# Patient Record
Sex: FEMALE | Race: WHITE | Employment: OTHER | URBAN - METROPOLITAN AREA
[De-identification: names, ages, dates, MRNs, and addresses within clinical notes are randomized per-mention and may not be internally consistent; named-entity substitution may affect disease eponyms.]

---

## 2017-11-17 ENCOUNTER — HOSPITAL ENCOUNTER (OUTPATIENT)
Dept: MAMMOGRAPHY | Age: 82
Discharge: HOME | End: 2017-11-17
Attending: FAMILY MEDICINE
Payer: MEDICARE

## 2017-11-17 DIAGNOSIS — Z12.39 SCREENING BREAST EXAMINATION: ICD-10-CM

## 2017-11-17 PROCEDURE — 77067 SCR MAMMO BI INCL CAD: CPT

## 2018-09-26 ENCOUNTER — APPOINTMENT (OUTPATIENT)
Dept: CT IMAGING | Age: 83
End: 2018-09-26
Attending: NURSE PRACTITIONER
Payer: MEDICARE

## 2018-09-26 ENCOUNTER — HOSPITAL ENCOUNTER (EMERGENCY)
Age: 83
Discharge: HOME | End: 2018-09-26
Attending: EMERGENCY MEDICINE
Payer: MEDICARE

## 2018-09-26 VITALS
WEIGHT: 176.37 LBS | BODY MASS INDEX: 29.38 KG/M2 | DIASTOLIC BLOOD PRESSURE: 75 MMHG | HEIGHT: 65 IN | TEMPERATURE: 97.9 F | RESPIRATION RATE: 18 BRPM | SYSTOLIC BLOOD PRESSURE: 153 MMHG | OXYGEN SATURATION: 98 % | HEART RATE: 82 BPM

## 2018-09-26 DIAGNOSIS — S01.01XA LACERATION OF SCALP, INITIAL ENCOUNTER: Primary | ICD-10-CM
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
## 2018-09-26 DIAGNOSIS — S09.90XA CLOSED HEAD INJURY, INITIAL ENCOUNTER: ICD-10-CM

## 2018-09-26 PROCEDURE — 74011250636 HC RX REV CODE- 250/636: Performed by: NURSE PRACTITIONER

## 2018-09-26 PROCEDURE — 99282 EMERGENCY DEPT VISIT SF MDM: CPT

## 2018-09-26 PROCEDURE — 72125 CT NECK SPINE W/O DYE: CPT

## 2018-09-26 PROCEDURE — 90471 IMMUNIZATION ADMIN: CPT

## 2018-09-26 PROCEDURE — 90715 TDAP VACCINE 7 YRS/> IM: CPT | Performed by: NURSE PRACTITIONER

## 2018-09-26 PROCEDURE — 70450 CT HEAD/BRAIN W/O DYE: CPT

## 2018-09-26 PROCEDURE — 77030018836 HC SOL IRR NACL ICUM -A

## 2018-09-26 PROCEDURE — 75810000293 HC SIMP/SUPERF WND  RPR

## 2018-09-26 PROCEDURE — 77030031132 HC SUT NYL COVD -A

## 2018-09-26 RX ORDER — LIDOCAINE HYDROCHLORIDE 10 MG/ML
5 INJECTION, SOLUTION EPIDURAL; INFILTRATION; INTRACAUDAL; PERINEURAL ONCE
Status: COMPLETED | OUTPATIENT
Start: 2018-09-26 | End: 2018-09-26

## 2018-09-26 RX ORDER — BACITRACIN 500 UNIT/G
1 PACKET (EA) TOPICAL
Status: DISCONTINUED | OUTPATIENT
Start: 2018-09-26 | End: 2018-09-26 | Stop reason: HOSPADM

## 2018-09-26 RX ADMIN — TETANUS TOXOID, REDUCED DIPHTHERIA TOXOID AND ACELLULAR PERTUSSIS VACCINE, ADSORBED 0.5 ML: 5; 2.5; 8; 8; 2.5 SUSPENSION INTRAMUSCULAR at 12:59

## 2018-09-26 RX ADMIN — LIDOCAINE HYDROCHLORIDE 5 ML: 10 INJECTION, SOLUTION EPIDURAL; INFILTRATION; INTRACAUDAL; PERINEURAL at 12:59

## 2018-09-26 NOTE — DISCHARGE INSTRUCTIONS
Preventing Falls: Care Instructions  Your Care Instructions    Getting around your home safely can be a challenge if you have injuries or health problems that make it easy for you to fall. Loose rugs and furniture in walkways are among the dangers for many older people who have problems walking or who have poor eyesight. People who have conditions such as arthritis, osteoporosis, or dementia also have to be careful not to fall. You can make your home safer with a few simple measures. Follow-up care is a key part of your treatment and safety. Be sure to make and go to all appointments, and call your doctor if you are having problems. It's also a good idea to know your test results and keep a list of the medicines you take. How can you care for yourself at home? Taking care of yourself  · You may get dizzy if you do not drink enough water. To prevent dehydration, drink plenty of fluids, enough so that your urine is light yellow or clear like water. Choose water and other caffeine-free clear liquids. If you have kidney, heart, or liver disease and have to limit fluids, talk with your doctor before you increase the amount of fluids you drink. · Exercise regularly to improve your strength, muscle tone, and balance. Walk if you can. Swimming may be a good choice if you cannot walk easily. · Have your vision and hearing checked each year or any time you notice a change. If you have trouble seeing and hearing, you might not be able to avoid objects and could lose your balance. · Know the side effects of the medicines you take. Ask your doctor or pharmacist whether the medicines you take can affect your balance. Sleeping pills or sedatives can affect your balance. · Limit the amount of alcohol you drink. Alcohol can impair your balance and other senses. · Ask your doctor whether calluses or corns on your feet need to be removed.  If you wear loose-fitting shoes because of calluses or corns, you can lose your balance and fall. · Talk to your doctor if you have numbness in your feet. Preventing falls at home  · Remove raised doorway thresholds, throw rugs, and clutter. Repair loose carpet or raised areas in the floor. · Move furniture and electrical cords to keep them out of walking paths. · Use nonskid floor wax, and wipe up spills right away, especially on ceramic tile floors. · If you use a walker or cane, put rubber tips on it. If you use crutches, clean the bottoms of them regularly with an abrasive pad, such as steel wool. · Keep your house well lit, especially [facility], and outside walkways. Use night-lights in areas such as hallways and bathrooms. Add extra light switches or use remote switches (such as switches that go on or off when you clap your hands) to make it easier to turn lights on if you have to get up during the night. · Install sturdy handrails on stairways. · Move items in your cabinets so that the things you use a lot are on the lower shelves (about waist level). · Keep a cordless phone and a flashlight with new batteries by your bed. If possible, put a phone in each of the main rooms of your house, or carry a cell phone in case you fall and cannot reach a phone. Or, you can wear a device around your neck or wrist. You push a button that sends a signal for help. · Wear low-heeled shoes that fit well and give your feet good support. Use footwear with nonskid soles. Check the heels and soles of your shoes for wear. Repair or replace worn heels or soles. · Do not wear socks without shoes on wood floors. · Walk on the grass when the sidewalks are slippery. If you live in an area that gets snow and ice in the winter, sprinkle salt on slippery steps and sidewalks. Preventing falls in the bath  · Install grab bars and nonskid mats inside and outside your shower or tub and near the toilet and sinks. · Use shower chairs and bath benches.   · Use a hand-held shower head that will allow you to sit while showering. · Get into a tub or shower by putting the weaker leg in first. Get out of a tub or shower with your strong side first.  · Repair loose toilet seats and consider installing a raised toilet seat to make getting on and off the toilet easier. · Keep your bathroom door unlocked while you are in the shower. Where can you learn more? Go to http://eric-julius.info/. Enter 0476 [id number] 69 [id number] in the search box to learn more about \"Preventing Falls: Care Instructions. \"  Current as of: May 12, 2017  Content Version: 11.7  © 6777-7557 [employer]. Care instructions adapted under license by "[employer] (which disclaims liability or warranty for this information). If you have questions about a medical condition or this instruction, always ask your healthcare professional. [place] [address] any warranty or liability for your use of this information. Learning About a Closed Head Injury  What is a closed head injury? A closed head injury happens when your head gets hit hard. The strong force of the blow causes your brain to shake in your skull. This movement can cause the brain to bruise, swell, or tear. Sometimes nerves or blood vessels also get damaged. This can cause bleeding in or around the brain. A concussion is a type of closed head injury. What are the symptoms? If you have a mild concussion, you may have a mild headache or feel \"not quite right. \" These symptoms are common. They usually go away over a few days to 4 weeks. But sometimes after a concussion, you feel like you can't function as well as before the injury. And you have new symptoms. This is called postconcussive syndrome. You may:  · Find it harder to solve problems, think, concentrate, or remember. · Have headaches. · Have changes in your sleep patterns, such as not being able to sleep or sleeping all the time. · Have changes in your personality.   · Not be interested in your usual activities. · Feel angry or anxious without a clear reason. · Lose your sense of taste or smell. · Be dizzy, lightheaded, or unsteady. It may be hard to stand or walk. How is a closed head injury treated? Any person who may have a concussion needs to see a doctor. Some people have to stay in the hospital to be watched. Others can go home safely. If you go home, follow your doctor's instructions. He or she will tell you if you need someone to watch you closely for the next 24 hours or longer. Rest is the best treatment. Get plenty of sleep at night. And try to rest during the day. · Avoid activities that are physically or mentally demanding. These include housework, exercise, and schoolwork. And don't play video games, send text messages, or use the computer. You may need to change your school or work schedule to be able to avoid these activities. · Ask your doctor when it's okay to drive, ride a bike, or operate machinery. · Take an over-the-counter pain medicine, such as acetaminophen (Tylenol), ibuprofen (Advil, Motrin), or naproxen (Aleve). Be safe with medicines. Read and follow all instructions on the label. · Check with your doctor before you use any other medicines for pain. · Do not drink alcohol or use illegal drugs. They can slow recovery. They can also increase your risk of getting a second head injury. Follow-up care is a key part of your treatment and safety. Be sure to make and go to all appointments, and call your doctor if you are having problems. It's also a good idea to know your test results and keep a list of the medicines you take. Where can you learn more? Go to http://eric-julius.info/. Enter E235 in the search box to learn more about \"Learning About a Closed Head Injury. \"  Current as of: October 9, 2017  Content Version: 11.7  © 6633-2025 [employer].  Care instructions adapted under license by [employer] (which disclaims liability or warranty for this information). If you have questions about a medical condition or this instruction, always ask your healthcare professional. [address] any warranty or liability for your use of this information. We hope that we have addressed all of your medical concerns. The examination and treatment you received in the Emergency Department were for an emergent problem and were not intended as complete care. It is important that you follow up with your healthcare provider(s) for ongoing care. If your symptoms worsen or do not improve as expected, and you are unable to reach your usual health care provider(s), you should return to the Emergency Department. Today's healthcare is undergoing tremendous change, and patient satisfaction surveys are one of the many tools to assess the quality of medical care. You may receive a survey from the [employer] organization regarding your experience in the Emergency Department. I hope that your experience has been completely positive, particularly the medical care that I provided. As such, please participate in the survey; anything less than excellent does not meet my expectations or intentions. [address] participate in nationally recognized quality of care measures. If your blood pressure is greater than 120/80, as reported below, we urge that you seek medical care to address the potential of high blood pressure, commonly known as hypertension. Hypertension can be hereditary or can be caused by certain medical conditions, pain, stress, or \"white coat syndrome. \"       Please make an appointment with your health care provider(s) for follow up of your Emergency Department visit. VITALS:   Patient Vitals for the past 8 hrs:   Temp Pulse Resp BP SpO2   09/26/18 1128 97.9 °F (36.6 °C) 82 18 153/75 98 %          Thank you for allowing us to provide you with medical care today.   We realize that you have many choices for your emergency care needs. Please choose us in the future for any continued health care needs. Quincy Hercules Brain, NP              No results found for this or any previous visit (from the past 24 hour(s)). Ct Head Wo Cont    Result Date: 9/26/2018  INDICATION:   fall EXAMINATION:  CT HEAD WO CONTRAST COMPARISON:  None TECHNIQUE:  Routine noncontrast axial head CT was performed. Sagittal and coronal reconstructions were generated. CT dose reduction was achieved through use of a standardized protocol tailored for this examination and automatic exposure control for dose modulation. Michael Martinez FINDINGS: Ventricles:  Midline, no hydrocephalus. Intracranial Hemorrhage:  None. Brain Parenchyma/Brainstem:  Normal for age. Basal Cisterns:  Normal. Paranasal Sinuses:  Visualized sinuses are clear. Soft Tissues:  No significant soft tissue swelling. Osseous Structures:  No acute fracture. Additional Comments:  N/A. IMPRESSION: No acute traumatic injury. Ct Spine Cerv Wo Cont    Result Date: 9/26/2018  INDICATION:   fall COMPARISON: None. TECHNIQUE:   Noncontrast axial CT imaging of the cervical spine was performed. Coronal and sagittal reconstructions were obtained. CT dose reduction was achieved through use of a standardized protocol tailored for this examination and automatic exposure control for dose modulation. FINDINGS: There is no acute fracture or subluxation. Accentuated lordosis of the cervical spine. Prior anterior cervical fusion C6-7 with intact vertebral body screws, fixation plate and interbody graft. No prevertebral soft tissue swelling. Lung apices are clear. Mild calcific atherosclerosis bilateral carotid bifurcations. IMPRESSION: Postsurgical changes as above. No acute fracture.

## 2018-12-03 ENCOUNTER — HOSPITAL ENCOUNTER (OUTPATIENT)
Dept: MAMMOGRAPHY | Age: 83
Discharge: HOME | End: 2018-12-03
Attending: FAMILY MEDICINE
Payer: MEDICARE

## 2018-12-03 DIAGNOSIS — Z12.31 VISIT FOR SCREENING MAMMOGRAM: ICD-10-CM

## 2018-12-03 PROCEDURE — 77067 SCR MAMMO BI INCL CAD: CPT

## 2020-01-02 ENCOUNTER — HOSPITAL ENCOUNTER (OUTPATIENT)
Dept: MAMMOGRAPHY | Age: 85
Discharge: HOME | End: 2020-01-02
Attending: FAMILY MEDICINE
Payer: MEDICARE

## 2020-01-02 DIAGNOSIS — Z12.31 VISIT FOR SCREENING MAMMOGRAM: ICD-10-CM

## 2020-01-02 PROCEDURE — 77067 SCR MAMMO BI INCL CAD: CPT

## 2020-12-28 ENCOUNTER — TRANSCRIBE ORDER (OUTPATIENT)
Dept: SCHEDULING | Age: 85
End: 2020-12-28

## 2020-12-28 DIAGNOSIS — Z12.31 VISIT FOR SCREENING MAMMOGRAM: Primary | ICD-10-CM

## 2021-02-10 ENCOUNTER — HOSPITAL ENCOUNTER (OUTPATIENT)
Dept: MAMMOGRAPHY | Age: 86
Discharge: HOME | End: 2021-02-10
Attending: FAMILY MEDICINE
Payer: MEDICARE

## 2021-02-10 DIAGNOSIS — Z12.31 VISIT FOR SCREENING MAMMOGRAM: ICD-10-CM

## 2021-02-10 PROCEDURE — 77067 SCR MAMMO BI INCL CAD: CPT

## 2021-05-11 ENCOUNTER — HOSPITAL ENCOUNTER (OUTPATIENT)
Dept: PREADMISSION TESTING | Age: 86
Discharge: HOME | End: 2021-05-11
Attending: ORTHOPAEDIC SURGERY
Payer: MEDICARE

## 2021-05-11 VITALS
HEART RATE: 91 BPM | SYSTOLIC BLOOD PRESSURE: 116 MMHG | OXYGEN SATURATION: 99 % | DIASTOLIC BLOOD PRESSURE: 75 MMHG | BODY MASS INDEX: 31.52 KG/M2 | RESPIRATION RATE: 16 BRPM | HEIGHT: 63 IN | WEIGHT: 177.91 LBS | TEMPERATURE: 98.2 F

## 2021-05-11 LAB
ABO + RH BLD: NORMAL
ALBUMIN SERPL-MCNC: 3.5 G/DL (ref 3.5–5)
ALBUMIN/GLOB SERPL: 1.1 {RATIO} (ref 1.1–2.2)
ALP SERPL-CCNC: 79 U/L (ref 45–117)
ALT SERPL-CCNC: 21 U/L (ref 12–78)
ANION GAP SERPL CALC-SCNC: 3 MMOL/L (ref 5–15)
APPEARANCE UR: CLEAR
AST SERPL-CCNC: 16 U/L (ref 15–37)
ATRIAL RATE: 79 BPM
BACTERIA URNS QL MICRO: NEGATIVE /HPF
BILIRUB SERPL-MCNC: 0.5 MG/DL (ref 0.2–1)
BILIRUB UR QL: NEGATIVE
BLOOD GROUP ANTIBODIES SERPL: NORMAL
BUN SERPL-MCNC: 11 MG/DL (ref 6–20)
BUN/CREAT SERPL: 15 (ref 12–20)
CALCIUM SERPL-MCNC: 8.8 MG/DL (ref 8.5–10.1)
CALCULATED P AXIS, ECG09: 17 DEGREES
CALCULATED R AXIS, ECG10: -3 DEGREES
CALCULATED T AXIS, ECG11: 89 DEGREES
CHLORIDE SERPL-SCNC: 110 MMOL/L (ref 97–108)
CO2 SERPL-SCNC: 28 MMOL/L (ref 21–32)
COLOR UR: ABNORMAL
CREAT SERPL-MCNC: 0.72 MG/DL (ref 0.55–1.02)
DIAGNOSIS, 93000: NORMAL
EPITH CASTS URNS QL MICRO: ABNORMAL /LPF
ERYTHROCYTE [DISTWIDTH] IN BLOOD BY AUTOMATED COUNT: 13.2 % (ref 11.5–14.5)
EST. AVERAGE GLUCOSE BLD GHB EST-MCNC: 108 MG/DL
GLOBULIN SER CALC-MCNC: 3.3 G/DL (ref 2–4)
GLUCOSE SERPL-MCNC: 100 MG/DL (ref 65–100)
GLUCOSE UR STRIP.AUTO-MCNC: NEGATIVE MG/DL
HBA1C MFR BLD: 5.4 % (ref 4–5.6)
HCT VFR BLD AUTO: 44.6 % (ref 35–47)
HGB BLD-MCNC: 14.5 G/DL (ref 11.5–16)
HGB UR QL STRIP: NEGATIVE
INR PPP: 1 (ref 0.9–1.1)
KETONES UR QL STRIP.AUTO: NEGATIVE MG/DL
LEUKOCYTE ESTERASE UR QL STRIP.AUTO: ABNORMAL
MCH RBC QN AUTO: 30.6 PG (ref 26–34)
MCHC RBC AUTO-ENTMCNC: 32.5 G/DL (ref 30–36.5)
MCV RBC AUTO: 94.1 FL (ref 80–99)
NITRITE UR QL STRIP.AUTO: NEGATIVE
NRBC # BLD: 0 K/UL (ref 0–0.01)
NRBC BLD-RTO: 0 PER 100 WBC
P-R INTERVAL, ECG05: 118 MS
PH UR STRIP: 5 [PH] (ref 5–8)
PLATELET # BLD AUTO: 207 K/UL (ref 150–400)
PMV BLD AUTO: 11 FL (ref 8.9–12.9)
POTASSIUM SERPL-SCNC: 3.6 MMOL/L (ref 3.5–5.1)
PROT SERPL-MCNC: 6.8 G/DL (ref 6.4–8.2)
PROT UR STRIP-MCNC: 30 MG/DL
PROTHROMBIN TIME: 10.6 SEC (ref 9–11.1)
Q-T INTERVAL, ECG07: 388 MS
QRS DURATION, ECG06: 100 MS
QTC CALCULATION (BEZET), ECG08: 444 MS
RBC # BLD AUTO: 4.74 M/UL (ref 3.8–5.2)
RBC #/AREA URNS HPF: ABNORMAL /HPF (ref 0–5)
SODIUM SERPL-SCNC: 141 MMOL/L (ref 136–145)
SP GR UR REFRACTOMETRY: 1.02 (ref 1–1.03)
SPECIMEN EXP DATE BLD: NORMAL
UA: UC IF INDICATED,UAUC: ABNORMAL
UROBILINOGEN UR QL STRIP.AUTO: 0.2 EU/DL (ref 0.2–1)
VENTRICULAR RATE, ECG03: 79 BPM
WBC # BLD AUTO: 5.5 K/UL (ref 3.6–11)
WBC URNS QL MICRO: ABNORMAL /HPF (ref 0–4)

## 2021-05-11 PROCEDURE — 80053 COMPREHEN METABOLIC PANEL: CPT

## 2021-05-11 PROCEDURE — 93005 ELECTROCARDIOGRAM TRACING: CPT

## 2021-05-11 PROCEDURE — 83036 HEMOGLOBIN GLYCOSYLATED A1C: CPT

## 2021-05-11 PROCEDURE — 36415 COLL VENOUS BLD VENIPUNCTURE: CPT

## 2021-05-11 PROCEDURE — 85610 PROTHROMBIN TIME: CPT

## 2021-05-11 PROCEDURE — 86901 BLOOD TYPING SEROLOGIC RH(D): CPT

## 2021-05-11 PROCEDURE — 81001 URINALYSIS AUTO W/SCOPE: CPT

## 2021-05-11 PROCEDURE — 85027 COMPLETE CBC AUTOMATED: CPT

## 2021-05-11 RX ORDER — PREGABALIN 75 MG/1
75 CAPSULE ORAL ONCE
Status: CANCELLED | OUTPATIENT
Start: 2021-05-20 | End: 2021-05-20

## 2021-05-11 RX ORDER — ACETAMINOPHEN 500 MG
1000 TABLET ORAL ONCE
Status: CANCELLED | OUTPATIENT
Start: 2021-05-20 | End: 2021-05-20

## 2021-05-11 RX ORDER — CELECOXIB 200 MG/1
400 CAPSULE ORAL ONCE
Status: CANCELLED | OUTPATIENT
Start: 2021-05-20 | End: 2021-05-20

## 2021-05-11 RX ORDER — SODIUM CHLORIDE, SODIUM LACTATE, POTASSIUM CHLORIDE, CALCIUM CHLORIDE 600; 310; 30; 20 MG/100ML; MG/100ML; MG/100ML; MG/100ML
25 INJECTION, SOLUTION INTRAVENOUS CONTINUOUS
Status: CANCELLED | OUTPATIENT
Start: 2021-05-20

## 2021-05-11 NOTE — PERIOP NOTES
[facility]  Joint/Spine Preoperative Instructions    Covid test scheduled on [place] May 16  between 07 and 4615. Please see attached location/directions and self-quarantine after test through day of surgery. Surgery Date May 20         Time of Arrival 0800  Contact# [phone]    1. On the day of your surgery, please report to the Surgical Services Registration Desk and sign in at your designated time. The Surgery Center is located to the right of the Emergency Room. 2. You must have someone with you to drive you home. You should not drive a car for 24 hours following surgery. Please make arrangements for a friend or family member to stay with you for the first 24 hours after your surgery. 3. No food after midnight May 19  Medications morning of surgery should be taken with a sip of water. Please follow pre-surgery drink instructions that were given at your Pre Admission Testing appointment. 4. We recommend you do not drink any alcoholic beverages for 24 hours before and after your surgery. 5. Contact your surgeons office for instructions on the following medications: non-steroidal anti-inflammatory drugs (i.e. Advil, Aleve), vitamins, and supplements. (Some surgeons will want you to stop these medications prior to surgery and others may allow you to take them)  **If you are currently taking Plavix, Coumadin, Aspirin and/or other blood-thinning agents, contact your surgeon for instructions. ** Your surgeon will partner with the physician prescribing these medications to determine if it is safe to stop or if you need to continue taking. Please do not stop taking these medications without instructions from your surgeon    6. Wear comfortable clothes. Wear glasses instead of contacts. Do not bring any money or jewelry. Please bring picture ID, insurance card, and any prearranged co-payment or hospital payment.   Do not wear make-up, particularly mascara the morning of your surgery. Do not wear nail polish, particularly if you are having foot /hand surgery. Wear your hair loose or down, no ponytails, buns, get pins or clips. All body piercings must be removed. Please shower with antibacterial soap for three consecutive days before and on the morning of surgery, but do not apply any lotions, powders or deodorants after the shower on the day of surgery. Please use a fresh towels after each shower. Please sleep in clean clothes and change bed linens the night before surgery. Please do not shave for 48 hours prior to surgery. Shaving of the face is acceptable. 7. You should understand that if you do not follow these instructions your surgery may be cancelled. If your physical condition changes (I.e. fever, cold or flu) please contact your surgeon as soon as possible. 8. It is important that you be on time. If a situation occurs where you may be late, please call [phone] (OR Holding Area). 9. If you have any questions and or problems, please call [phone] (Pre-admission Testing). 10. Your surgery time may be subject to change. You will receive a phone call the evening prior if your time changes. 11.  If having outpatient surgery, you must have someone to drive you here, stay with you during the duration of your stay, and to drive you home at time of discharge. 12. The following link is for the educational video for patients and/or families. http://de santiago-hicks.[employer]/. [email or web address]-materials    Special Instructions: Follow surgeon instructions for holding all non-steroidal anti-inflammatory drugs (NSAIDs), blood-thinning agents, vitamins & supplements prior to surgery.     Practice incentive spirometry twice a day- ten times each- bring day of usrgery    TAKE ALL MEDICATIONS THE DAY OF SURGERY EXCEPT:none      I understand a pre-operative phone call will be made to verify my surgery time. In the event that I am not available, I give permission for a message to be left on my answering service and/or with another person? yes         ___________________      __________   _________    (Signature of Patient)             (Witness)                (Date and Time)

## 2021-05-11 NOTE — PERIOP NOTES
Incentive Spirometer        Using the incentive spirometer helps expand the small air sacs of your lungs, helps you breathe deeply, and helps improve your lung function. Use your incentive spirometer twice a day (10 breaths each time) prior to surgery. How to Use Your Incentive Spirometer:  1. Hold the incentive spirometer in an upright position. 2. Breathe out as usual.   3. Place the mouthpiece in your mouth and seal your lips tightly around it. 4. Take a deep breath. Breathe in slowly and as deeply as possible. Keep the blue flow rate guide between the arrows. 5. Hold your breath as long as possible. Then exhale slowly and allow the piston to fall to the bottom of the column. 6. Rest for a few seconds and repeat steps one through five at least 10 times. PAT Tidal Volume____1000______________  x________2________  Date_______5/11/21________________    Morse Serve THE INCENTIVE SPIROMETER WITH YOU TO THE HOSPITAL ON THE DAY OF YOUR SURGERY. Opportunity given to ask and answer questions as well as to observe return demonstration.     Patient signature_____________________________    Witness____________________________

## 2021-05-11 NOTE — PERIOP NOTES
Orthopedic and Spine Patients: Instructions on When You Can   Eat or Drink Before Surgery      You have been provided 2 pre-surgery drinks received at your pre-admission testing appointment.  Night before surgery:  o You should drink one bottle of the  pre-surgery drink at bedtime. No food after midnight!  Day of Surgery:  o Complete 2nd bottle of the pre-surgery drink 1 hour prior to arrival at hospital.  For questions call Pre-Admission Testing at [phone]. They are available from 8:00am-5:00pm, Monday through Friday.

## 2021-05-11 NOTE — PERIOP NOTES
Hibiclens/Chlorhexidine    Preventing Infections Before and After  Your Surgery    IMPORTANT INSTRUCTIONS    Please read and follow these instructions carefully. If you are unable to comply with the below instructions your procedure will be cancelled. Every Night for Three (3) nights before your surgery:  1. Shower with an antibacterial soap, such as Dial, or the soap provided at your preassessment appointment. A shower is better than a bath for cleaning your skin. 2. If needed, ask someone to help you reach all areas of your body. Dont forget to clean your belly button with every shower. The night before your surgery: If you lose your Hibiclens/chlorhexidine please contact surgery center or you can purchase it at a local pharmacy  1. On the night before your surgery, shower with an antibacterial soap, such as Dial, or the soap provided at your preassessment appointment. 2. With one packet of Hibiclens/Chlorhexidine in hand, turn water off.  3. Apply Hibiclens antiseptic skin cleanser with a clean, freshly washed washcloth. ? Gently apply to your body from chin to toes (except the genital area) and especially the area(s) where your incision(s) will be. ? Leave Hibiclens/Chlorhexidine on your skin for at least 20 seconds. CAUTION: If needed, Hibiclens/chlorhexidine may be used to clean the folds of skin of the legs (such as in the area of the groin) and on your buttocks and hips. However, do not use Hibiclens/Chlorhexidine above the neck or in the genital area (your bottom) or put inside any area of your body. 4. Turn the water back on and rinse. 5. Dry gently with a clean, freshly washed towel. 6. After your shower, do not use any powder, deodorant, perfumes or lotion. 7. Use clean, freshly washed towels and washcloths every time you shower. 8. Wear clean, freshly washed pajamas to bed the night before surgery. 9. Sleep on clean, freshly washed sheets.   10. Do not allow pets to sleep in your bed with you. The Morning of your surgery:  1. Shower again thoroughly with an antibacterial soap, such as Dial or the soap provided at your preassessment appointment. If needed, ask someone for help to reach all areas of your body. Dont forget to clean your belly button! Rinse. 2. Dry gently with a clean, freshly washed towel. 3. After your shower, do not use any powder, deodorant, perfumes or lotion prior to surgery. 4. Put on clean, freshly washed clothing. Tips to help prevent infections after your surgery:  1. Protect your surgical wound from germs:  ? Hand washing is the most important thing you and your caregivers can do to prevent infections. ? Keep your bandage clean and dry! ? Do not touch your surgical wound. 2. Use clean, freshly washed towels and washcloths every time you shower; do not share bath linens with others. 3. Until your surgical wound is healed, wear clothing and sleep on bed linens each day that are clean and freshly washed. 4. Do not allow pets to sleep in your bed with you or touch your surgical wound. 5. Do not smoke  smoking delays wound healing. This may be a good time to stop smoking. 6. If you have diabetes, it is important for you to manage your blood sugar levels properly before your surgery as well as after your surgery. Poorly managed blood sugar levels slow down wound healing and prevent you from healing completely. If you lose your Hibiclens/chlorhexidine, please call the [facility], or it is available for purchase at your pharmacy.                ___________________      ___________________      ________________  (Signature of Patient)          (Witness)                   (Date and Time)

## 2021-05-12 LAB
BACTERIA SPEC CULT: NORMAL
BACTERIA SPEC CULT: NORMAL
SERVICE CMNT-IMP: NORMAL

## 2021-05-12 RX ORDER — CEFAZOLIN SODIUM 1 G/3ML
2 INJECTION, POWDER, FOR SOLUTION INTRAMUSCULAR; INTRAVENOUS ONCE
Status: CANCELLED | OUTPATIENT
Start: 2021-05-20 | End: 2021-05-20

## 2021-05-12 NOTE — ADVANCED PRACTICE NURSE
PAT Nurse Practitioner   Pre-Operative Chart Review/Assessment:-ORTHOPEDIC/NEUROSURGICAL SPINE                Patient Name:  Dany Lopez                                                         Age:   81 y.o.    :  1935     Today's Date:  2021     Date of PAT:   21      Date of Surgery:    21     Procedure(s):  Right  Total Knee Arthroplasty Revision Poly Swap     Surgeon:   Chuck Barrera     Medical Clearance:  Dr. Agueda Simon:      1)  Cardiac Clearance:  Not requested       2)  Diabetic Treatment Consult:  Not indicated--A1C 5.4      3)  Sleep Apnea evaluation:   Not indicated-RUDY 1      4) Treatment for MRSA/Staph Aureus:  Negative      5) Additional Concerns:  Osteoporosis, chronic pain, anxiety                Vital Signs:         Vitals:    21 0837   BP: 116/75   Pulse: 91   Resp: 16   Temp: 98.2 °F (36.8 °C)   SpO2: 99%   Weight: 80.7 kg (177 lb 14.6 oz)   Height: 5' 3\" (1.6 m)            ____________________________________________  PAST MEDICAL HISTORY  Past Medical History:   Diagnosis Date    Arthritis     History of colonoscopy with polypectomy     benign    History of kidney stones     passed in past    Hyperlipemia     Kidney stones     Osteoporosis       ____________________________________________  PAST SURGICAL HISTORY  Past Surgical History:   Procedure Laterality Date    HX BREAST BIOPSY Left     yrs ago (-)    HX COLONOSCOPY      HX ORTHOPAEDIC      gregorio TKR    HX ORTHOPAEDIC      rt 4th finger trigger release    HX DANNIE AND BSO      HX TONSILLECTOMY      HX UROLOGICAL      collegen implant & sling insertion      ____________________________________________  HOME MEDICATIONS    Current Outpatient Medications   Medication Sig    acetaminophen (TYLENOL) 325 mg tablet Take 325 mg by mouth every four (4) hours as needed. Indications: HEADACHE DISORDER    simvastatin (ZOCOR) 20 mg tablet Take 20 mg by mouth nightly.     MULTIVITS W-FE,OTHER MIN (CENTRUM PO) Take 1 Tab by mouth daily.  oxycodone (ROXICODONE) 5 mg immediate release tablet Take 1-3 Tabs by mouth every three (3) hours as needed for Pain.  diazepam (VALIUM) 5 mg tablet Take 1 Tab by mouth every eight (8) hours as needed for Anxiety.  ondansetron (ZOFRAN ODT) 4 mg disintegrating tablet Take 1 Tab by mouth every six (6) hours as needed for Nausea.  CALCIUM CARBONATE (CALCIUM 500 PO) Take 500 mg by mouth Daily (before breakfast).      No current facility-administered medications for this encounter.       ____________________________________________  ALLERGIES  No Known Allergies   ____________________________________________  SOCIAL HISTORY  Social History     Tobacco Use    Smoking status: Never Smoker    Smokeless tobacco: Never Used   Substance Use Topics    Alcohol use: No      ____________________________________________        Labs:     Hospital Outpatient Visit on 05/11/2021   Component Date Value Ref Range Status    Crossmatch Expiration 05/11/2021 05/23/2021,2359   Final    ABO/Rh(D) 05/11/2021 O POSITIVE   Final    Antibody screen 05/11/2021 NEG   Final    WBC 05/11/2021 5.5  3.6 - 11.0 K/uL Final    RBC 05/11/2021 4.74  3.80 - 5.20 M/uL Final    HGB 05/11/2021 14.5  11.5 - 16.0 g/dL Final    HCT 05/11/2021 44.6  35.0 - 47.0 % Final    MCV 05/11/2021 94.1  80.0 - 99.0 FL Final    MCH 05/11/2021 30.6  26.0 - 34.0 PG Final    MCHC 05/11/2021 32.5  30.0 - 36.5 g/dL Final    RDW 05/11/2021 13.2  11.5 - 14.5 % Final    PLATELET 14/89/3254 572  150 - 400 K/uL Final    MPV 05/11/2021 11.0  8.9 - 12.9 FL Final    NRBC 05/11/2021 0.0  0  WBC Final    ABSOLUTE NRBC 05/11/2021 0.00  0.00 - 0.01 K/uL Final    Sodium 05/11/2021 141  136 - 145 mmol/L Final    Potassium 05/11/2021 3.6  3.5 - 5.1 mmol/L Final    Chloride 05/11/2021 110* 97 - 108 mmol/L Final    CO2 05/11/2021 28  21 - 32 mmol/L Final    Anion gap 05/11/2021 3* 5 - 15 mmol/L Final  Glucose 05/11/2021 100  65 - 100 mg/dL Final    BUN 05/11/2021 11  6 - 20 MG/DL Final    Creatinine 05/11/2021 0.72  0.55 - 1.02 MG/DL Final    BUN/Creatinine ratio 05/11/2021 15  12 - 20   Final    GFR est AA 05/11/2021 >60  >60 ml/min/1.73m2 Final    GFR est non-AA 05/11/2021 >60  >60 ml/min/1.73m2 Final    Estimated GFR is calculated using the IDMS-traceable Modification of Diet in Renal Disease (MDRD) Study equation, reported for both  Americans (GFRAA) and non- Americans (GFRNA), and normalized to 1.73m2 body surface area. The physician must decide which value applies to the patient.  Calcium 05/11/2021 8.8  8.5 - 10.1 MG/DL Final    Bilirubin, total 05/11/2021 0.5  0.2 - 1.0 MG/DL Final    ALT (SGPT) 05/11/2021 21  12 - 78 U/L Final    AST (SGOT) 05/11/2021 16  15 - 37 U/L Final    Alk. phosphatase 05/11/2021 79  45 - 117 U/L Final    Protein, total 05/11/2021 6.8  6.4 - 8.2 g/dL Final    Albumin 05/11/2021 3.5  3.5 - 5.0 g/dL Final    Globulin 05/11/2021 3.3  2.0 - 4.0 g/dL Final    A-G Ratio 05/11/2021 1.1  1.1 - 2.2   Final    INR 05/11/2021 1.0  0.9 - 1.1   Final    A single therapeutic range for Vit K antagonists may not be optimal for all indications - see June, 2008 issue of Chest, American College of Chest Physicians Evidence-Based Clinical Practice Guidelines, 8th Edition.     Prothrombin time 05/11/2021 10.6  9.0 - 11.1 sec Final    Hemoglobin A1c 05/11/2021 5.4  4.0 - 5.6 % Final    Comment: NEW METHOD  PLEASE NOTE NEW REFERENCE RANGE  (NOTE)  HbA1C Interpretive Ranges  <5.7              Normal  5.7 - 6.4         Consider Prediabetes  >6.5              Consider Diabetes      Est. average glucose 05/11/2021 108  mg/dL Final    Color 05/11/2021 YELLOW/STRAW    Final    Color Reference Range: Straw, Yellow or Dark Yellow    Appearance 05/11/2021 CLEAR  CLEAR   Final    Specific gravity 05/11/2021 1.025  1.003 - 1.030   Final    pH (UA) 05/11/2021 5.0  5.0 - 8.0 Final    Protein 05/11/2021 30* NEG mg/dL Final    Glucose 05/11/2021 Negative  NEG mg/dL Final    Ketone 05/11/2021 Negative  NEG mg/dL Final    Bilirubin 05/11/2021 Negative  NEG   Final    Blood 05/11/2021 Negative  NEG   Final    Urobilinogen 05/11/2021 0.2  0.2 - 1.0 EU/dL Final    Nitrites 05/11/2021 Negative  NEG   Final    Leukocyte Esterase 05/11/2021 TRACE* NEG   Final    WBC 05/11/2021 5-10  0 - 4 /hpf Final    RBC 05/11/2021 0-5  0 - 5 /hpf Final    Epithelial cells 05/11/2021 FEW  FEW /lpf Final    Epithelial cell category consists of squamous cells and /or transitional urothelial cells. Renal tubular cells, if present, are separately identified as such.  Bacteria 05/11/2021 Negative  NEG /hpf Final    UA:UC IF INDICATED 05/11/2021 CULTURE NOT INDICATED BY UA RESULT  CNI   Final    Special Requests: 05/11/2021 NO SPECIAL REQUESTS    Final    Culture result: 05/11/2021 MRSA NOT PRESENT    Final    Culture result: 05/11/2021 Screening of patient nares for MRSA is for surveillance purposes and, if positive, to facilitate isolation considerations in high risk settings. It is not intended for automatic decolonization interventions per se as regimens are not sufficiently effective to warrant routine use.     Final    Ventricular Rate 05/11/2021 79  BPM Final    Atrial Rate 05/11/2021 79  BPM Final    P-R Interval 05/11/2021 118  ms Final    QRS Duration 05/11/2021 100  ms Final    Q-T Interval 05/11/2021 388  ms Final    QTC Calculation (Bezet) 05/11/2021 444  ms Final    Calculated P Axis 05/11/2021 17  degrees Final    Calculated R Axis 05/11/2021 -3  degrees Final    Calculated T Axis 05/11/2021 89  degrees Final    Diagnosis 05/11/2021    Final                    Value:Sinus rhythm with premature atrial complexes  Incomplete right bundle branch block  Left ventricular hypertrophy with repolarization abnormality     Confirmed by Henri Gabriel M.D. ([id number]) on 5/11/2021 9:03:40 AM            Skin:   Denies open wounds, cuts, sores, rashes or other areas of concern in PAT assessment.         Paresh Guerrero NP

## 2021-05-16 ENCOUNTER — HOSPITAL ENCOUNTER (OUTPATIENT)
Dept: PREADMISSION TESTING | Age: 86
Discharge: HOME | End: 2021-05-16
Payer: MEDICARE

## 2021-05-16 LAB — SARS-COV-2, COV2: NORMAL

## 2021-05-16 PROCEDURE — U0003 INFECTIOUS AGENT DETECTION BY NUCLEIC ACID (DNA OR RNA); SEVERE ACUTE RESPIRATORY SYNDROME CORONAVIRUS 2 (SARS-COV-2) (CORONAVIRUS DISEASE [COVID-19]), AMPLIFIED PROBE TECHNIQUE, MAKING USE OF HIGH THROUGHPUT TECHNOLOGIES AS DESCRIBED BY CMS-2020-01-R: HCPCS

## 2021-05-17 LAB — SARS-COV-2, COV2NT: NOT DETECTED

## 2021-05-20 ENCOUNTER — HOSPITAL ENCOUNTER (OUTPATIENT)
Age: 86
Setting detail: OBSERVATION
Discharge: HOME | End: 2021-05-21
Attending: ORTHOPAEDIC SURGERY | Admitting: ORTHOPAEDIC SURGERY
Payer: MEDICARE

## 2021-05-20 ENCOUNTER — ANESTHESIA EVENT (OUTPATIENT)
Dept: SURGERY | Age: 86
End: 2021-05-20
Payer: MEDICARE

## 2021-05-20 ENCOUNTER — ANESTHESIA (OUTPATIENT)
Dept: SURGERY | Age: 86
End: 2021-05-20
Payer: MEDICARE

## 2021-05-20 DIAGNOSIS — Z96.651 S/P REVISION OF TOTAL KNEE, RIGHT: Primary | ICD-10-CM

## 2021-05-20 PROCEDURE — 74011250636 HC RX REV CODE- 250/636: Performed by: ANESTHESIOLOGY

## 2021-05-20 PROCEDURE — 77030033138 HC SUT PGA STRATFX J&J -B: Performed by: ORTHOPAEDIC SURGERY

## 2021-05-20 PROCEDURE — 99218 HC RM OBSERVATION: CPT

## 2021-05-20 PROCEDURE — C1776 JOINT DEVICE (IMPLANTABLE): HCPCS | Performed by: ORTHOPAEDIC SURGERY

## 2021-05-20 PROCEDURE — 76060000033 HC ANESTHESIA 1 TO 1.5 HR: Performed by: ORTHOPAEDIC SURGERY

## 2021-05-20 PROCEDURE — 77030000032 HC CUF TRNQT ZIMM -B: Performed by: ORTHOPAEDIC SURGERY

## 2021-05-20 PROCEDURE — 77030008684 HC TU ET CUF COVD -B: Performed by: ANESTHESIOLOGY

## 2021-05-20 PROCEDURE — 64450 NJX AA&/STRD OTHER PN/BRANCH: CPT | Performed by: ANESTHESIOLOGY

## 2021-05-20 PROCEDURE — 74011250636 HC RX REV CODE- 250/636: Performed by: ORTHOPAEDIC SURGERY

## 2021-05-20 PROCEDURE — 74011250636 HC RX REV CODE- 250/636: Performed by: NURSE ANESTHETIST, CERTIFIED REGISTERED

## 2021-05-20 PROCEDURE — 77030031139 HC SUT VCRL2 J&J -A: Performed by: ORTHOPAEDIC SURGERY

## 2021-05-20 PROCEDURE — 2709999900 HC NON-CHARGEABLE SUPPLY

## 2021-05-20 PROCEDURE — 97116 GAIT TRAINING THERAPY: CPT

## 2021-05-20 PROCEDURE — 2709999900 HC NON-CHARGEABLE SUPPLY: Performed by: ORTHOPAEDIC SURGERY

## 2021-05-20 PROCEDURE — 74011000250 HC RX REV CODE- 250: Performed by: NURSE ANESTHETIST, CERTIFIED REGISTERED

## 2021-05-20 PROCEDURE — 77030013079 HC BLNKT BAIR HGGR 3M -A: Performed by: ANESTHESIOLOGY

## 2021-05-20 PROCEDURE — 74011250637 HC RX REV CODE- 250/637: Performed by: PHYSICIAN ASSISTANT

## 2021-05-20 PROCEDURE — 77030022704 HC SUT VLOC COVD -B: Performed by: ORTHOPAEDIC SURGERY

## 2021-05-20 PROCEDURE — 77010033678 HC OXYGEN DAILY

## 2021-05-20 PROCEDURE — 74011250636 HC RX REV CODE- 250/636: Performed by: PHYSICIAN ASSISTANT

## 2021-05-20 PROCEDURE — 97161 PT EVAL LOW COMPLEX 20 MIN: CPT

## 2021-05-20 PROCEDURE — 76010000161 HC OR TIME 1 TO 1.5 HR INTENSV-TIER 1: Performed by: ORTHOPAEDIC SURGERY

## 2021-05-20 PROCEDURE — 94760 N-INVAS EAR/PLS OXIMETRY 1: CPT

## 2021-05-20 PROCEDURE — 77030041074 HC DRSG AG OPTIFRM MDII -A: Performed by: ORTHOPAEDIC SURGERY

## 2021-05-20 PROCEDURE — 77030026438 HC STYL ET INTUB CARD -A: Performed by: ANESTHESIOLOGY

## 2021-05-20 PROCEDURE — 74011250637 HC RX REV CODE- 250/637: Performed by: ORTHOPAEDIC SURGERY

## 2021-05-20 PROCEDURE — 77030039497 HC CST PAD STERILE CHCS -A: Performed by: ORTHOPAEDIC SURGERY

## 2021-05-20 PROCEDURE — 76210000002 HC OR PH I REC 3 TO 3.5 HR: Performed by: ORTHOPAEDIC SURGERY

## 2021-05-20 PROCEDURE — 77030019908 HC STETH ESOPH SIMS -A: Performed by: ANESTHESIOLOGY

## 2021-05-20 PROCEDURE — 77030003601 HC NDL NRV BLK BBMI -A: Performed by: ANESTHESIOLOGY

## 2021-05-20 PROCEDURE — 77030034479 HC ADH SKN CLSR PRINEO J&J -B: Performed by: ORTHOPAEDIC SURGERY

## 2021-05-20 PROCEDURE — 77030040361 HC SLV COMPR DVT MDII -B: Performed by: ORTHOPAEDIC SURGERY

## 2021-05-20 PROCEDURE — 74011000250 HC RX REV CODE- 250: Performed by: PHYSICIAN ASSISTANT

## 2021-05-20 PROCEDURE — 77030020061 HC IV BLD WRMR ADMIN SET 3M -B: Performed by: ANESTHESIOLOGY

## 2021-05-20 PROCEDURE — 77030018673: Performed by: ORTHOPAEDIC SURGERY

## 2021-05-20 PROCEDURE — 77030033067 HC SUT PDO STRATFX SPIR J&J -B: Performed by: ORTHOPAEDIC SURGERY

## 2021-05-20 DEVICE — IMPLANTABLE DEVICE
Type: IMPLANTABLE DEVICE | Site: KNEE | Status: FUNCTIONAL
Brand: BIOMET® KNEE SYSTEM

## 2021-05-20 DEVICE — IMPLANTABLE DEVICE
Type: IMPLANTABLE DEVICE | Site: KNEE | Status: FUNCTIONAL
Brand: VANGUARD® KNEE SYSTEM

## 2021-05-20 RX ORDER — DIPHENHYDRAMINE HCL 12.5MG/5ML
12.5 LIQUID (ML) ORAL
Status: DISCONTINUED | OUTPATIENT
Start: 2021-05-20 | End: 2021-05-21 | Stop reason: HOSPADM

## 2021-05-20 RX ORDER — MORPHINE SULFATE 2 MG/ML
1 INJECTION, SOLUTION INTRAMUSCULAR; INTRAVENOUS
Status: DISCONTINUED | OUTPATIENT
Start: 2021-05-20 | End: 2021-05-21 | Stop reason: HOSPADM

## 2021-05-20 RX ORDER — LIDOCAINE HYDROCHLORIDE 10 MG/ML
0.1 INJECTION, SOLUTION EPIDURAL; INFILTRATION; INTRACAUDAL; PERINEURAL AS NEEDED
Status: DISCONTINUED | OUTPATIENT
Start: 2021-05-20 | End: 2021-05-20 | Stop reason: HOSPADM

## 2021-05-20 RX ORDER — NALOXONE HYDROCHLORIDE 0.4 MG/ML
0.4 INJECTION, SOLUTION INTRAMUSCULAR; INTRAVENOUS; SUBCUTANEOUS AS NEEDED
Status: DISCONTINUED | OUTPATIENT
Start: 2021-05-20 | End: 2021-05-21 | Stop reason: HOSPADM

## 2021-05-20 RX ORDER — SODIUM CHLORIDE, SODIUM LACTATE, POTASSIUM CHLORIDE, CALCIUM CHLORIDE 600; 310; 30; 20 MG/100ML; MG/100ML; MG/100ML; MG/100ML
25 INJECTION, SOLUTION INTRAVENOUS CONTINUOUS
Status: DISCONTINUED | OUTPATIENT
Start: 2021-05-20 | End: 2021-05-20 | Stop reason: HOSPADM

## 2021-05-20 RX ORDER — FENTANYL CITRATE 50 UG/ML
25 INJECTION, SOLUTION INTRAMUSCULAR; INTRAVENOUS
Status: DISCONTINUED | OUTPATIENT
Start: 2021-05-20 | End: 2021-05-20 | Stop reason: HOSPADM

## 2021-05-20 RX ORDER — CELECOXIB 200 MG/1
400 CAPSULE ORAL ONCE
Status: COMPLETED | OUTPATIENT
Start: 2021-05-20 | End: 2021-05-20

## 2021-05-20 RX ORDER — AMOXICILLIN 250 MG
1 CAPSULE ORAL 2 TIMES DAILY
Status: DISCONTINUED | OUTPATIENT
Start: 2021-05-20 | End: 2021-05-21 | Stop reason: HOSPADM

## 2021-05-20 RX ORDER — DEXAMETHASONE SODIUM PHOSPHATE 100 MG/10ML
INJECTION INTRAMUSCULAR; INTRAVENOUS AS NEEDED
Status: DISCONTINUED | OUTPATIENT
Start: 2021-05-20 | End: 2021-05-20

## 2021-05-20 RX ORDER — ACETAMINOPHEN 500 MG
1000 TABLET ORAL ONCE
Status: COMPLETED | OUTPATIENT
Start: 2021-05-20 | End: 2021-05-20

## 2021-05-20 RX ORDER — ACETAMINOPHEN 325 MG/1
650 TABLET ORAL EVERY 6 HOURS
Status: DISCONTINUED | OUTPATIENT
Start: 2021-05-20 | End: 2021-05-21 | Stop reason: HOSPADM

## 2021-05-20 RX ORDER — FAMOTIDINE 20 MG/1
20 TABLET, FILM COATED ORAL DAILY
Status: DISCONTINUED | OUTPATIENT
Start: 2021-05-21 | End: 2021-05-21 | Stop reason: HOSPADM

## 2021-05-20 RX ORDER — SODIUM CHLORIDE 0.9 % (FLUSH) 0.9 %
5-40 SYRINGE (ML) INJECTION EVERY 8 HOURS
Status: DISCONTINUED | OUTPATIENT
Start: 2021-05-20 | End: 2021-05-20 | Stop reason: HOSPADM

## 2021-05-20 RX ORDER — SODIUM CHLORIDE 9 MG/ML
75 INJECTION, SOLUTION INTRAVENOUS CONTINUOUS
Status: DISPENSED | OUTPATIENT
Start: 2021-05-20 | End: 2021-05-21

## 2021-05-20 RX ORDER — PREGABALIN 75 MG/1
75 CAPSULE ORAL ONCE
Status: COMPLETED | OUTPATIENT
Start: 2021-05-20 | End: 2021-05-20

## 2021-05-20 RX ORDER — OXYCODONE HYDROCHLORIDE 5 MG/1
5 TABLET ORAL
Status: DISCONTINUED | OUTPATIENT
Start: 2021-05-20 | End: 2021-05-20

## 2021-05-20 RX ORDER — SODIUM CHLORIDE 0.9 % (FLUSH) 0.9 %
5-40 SYRINGE (ML) INJECTION AS NEEDED
Status: DISCONTINUED | OUTPATIENT
Start: 2021-05-20 | End: 2021-05-21 | Stop reason: HOSPADM

## 2021-05-20 RX ORDER — DEXAMETHASONE SODIUM PHOSPHATE 4 MG/ML
10 INJECTION, SOLUTION INTRA-ARTICULAR; INTRALESIONAL; INTRAMUSCULAR; INTRAVENOUS; SOFT TISSUE ONCE
Status: COMPLETED | OUTPATIENT
Start: 2021-05-21 | End: 2021-05-21

## 2021-05-20 RX ORDER — OXYCODONE HYDROCHLORIDE 5 MG/1
2.5 TABLET ORAL
Status: DISCONTINUED | OUTPATIENT
Start: 2021-05-20 | End: 2021-05-20

## 2021-05-20 RX ORDER — ATORVASTATIN CALCIUM 10 MG/1
10 TABLET, FILM COATED ORAL
Status: DISCONTINUED | OUTPATIENT
Start: 2021-05-20 | End: 2021-05-21 | Stop reason: HOSPADM

## 2021-05-20 RX ORDER — TRAMADOL HYDROCHLORIDE 50 MG/1
50-100 TABLET ORAL
Status: DISCONTINUED | OUTPATIENT
Start: 2021-05-20 | End: 2021-05-20 | Stop reason: SDUPTHER

## 2021-05-20 RX ORDER — ASPIRIN 81 MG/1
81 TABLET ORAL 2 TIMES DAILY
Status: DISCONTINUED | OUTPATIENT
Start: 2021-05-20 | End: 2021-05-21 | Stop reason: HOSPADM

## 2021-05-20 RX ORDER — FACIAL-BODY WIPES
10 EACH TOPICAL DAILY PRN
Status: DISCONTINUED | OUTPATIENT
Start: 2021-05-22 | End: 2021-05-21 | Stop reason: HOSPADM

## 2021-05-20 RX ORDER — HYDROMORPHONE HYDROCHLORIDE 2 MG/ML
INJECTION, SOLUTION INTRAMUSCULAR; INTRAVENOUS; SUBCUTANEOUS AS NEEDED
Status: DISCONTINUED | OUTPATIENT
Start: 2021-05-20 | End: 2021-05-20 | Stop reason: HOSPADM

## 2021-05-20 RX ORDER — PROPOFOL 10 MG/ML
INJECTION, EMULSION INTRAVENOUS AS NEEDED
Status: DISCONTINUED | OUTPATIENT
Start: 2021-05-20 | End: 2021-05-20 | Stop reason: HOSPADM

## 2021-05-20 RX ORDER — DEXAMETHASONE SODIUM PHOSPHATE 100 MG/10ML
INJECTION INTRAMUSCULAR; INTRAVENOUS AS NEEDED
Status: DISCONTINUED | OUTPATIENT
Start: 2021-05-20 | End: 2021-05-20 | Stop reason: HOSPADM

## 2021-05-20 RX ORDER — POLYETHYLENE GLYCOL 3350 17 G/17G
17 POWDER, FOR SOLUTION ORAL DAILY
Status: DISCONTINUED | OUTPATIENT
Start: 2021-05-21 | End: 2021-05-21 | Stop reason: HOSPADM

## 2021-05-20 RX ORDER — SUCCINYLCHOLINE CHLORIDE 20 MG/ML
INJECTION INTRAMUSCULAR; INTRAVENOUS AS NEEDED
Status: DISCONTINUED | OUTPATIENT
Start: 2021-05-20 | End: 2021-05-20 | Stop reason: HOSPADM

## 2021-05-20 RX ORDER — TRAMADOL HYDROCHLORIDE 50 MG/1
100 TABLET ORAL
Status: DISCONTINUED | OUTPATIENT
Start: 2021-05-20 | End: 2021-05-21 | Stop reason: HOSPADM

## 2021-05-20 RX ORDER — SODIUM CHLORIDE 0.9 % (FLUSH) 0.9 %
5-40 SYRINGE (ML) INJECTION EVERY 8 HOURS
Status: DISCONTINUED | OUTPATIENT
Start: 2021-05-20 | End: 2021-05-21 | Stop reason: HOSPADM

## 2021-05-20 RX ORDER — SODIUM CHLORIDE 0.9 % (FLUSH) 0.9 %
5-40 SYRINGE (ML) INJECTION AS NEEDED
Status: DISCONTINUED | OUTPATIENT
Start: 2021-05-20 | End: 2021-05-20 | Stop reason: HOSPADM

## 2021-05-20 RX ORDER — DEXMEDETOMIDINE HYDROCHLORIDE 100 UG/ML
INJECTION, SOLUTION INTRAVENOUS AS NEEDED
Status: DISCONTINUED | OUTPATIENT
Start: 2021-05-20 | End: 2021-05-20 | Stop reason: HOSPADM

## 2021-05-20 RX ORDER — HYDROMORPHONE HYDROCHLORIDE 1 MG/ML
.2-.5 INJECTION, SOLUTION INTRAMUSCULAR; INTRAVENOUS; SUBCUTANEOUS
Status: DISCONTINUED | OUTPATIENT
Start: 2021-05-20 | End: 2021-05-20 | Stop reason: HOSPADM

## 2021-05-20 RX ORDER — ONDANSETRON 4 MG/1
4 TABLET, ORALLY DISINTEGRATING ORAL
Status: DISCONTINUED | OUTPATIENT
Start: 2021-05-22 | End: 2021-05-21 | Stop reason: HOSPADM

## 2021-05-20 RX ORDER — TRAMADOL HYDROCHLORIDE 50 MG/1
50 TABLET ORAL
Status: DISCONTINUED | OUTPATIENT
Start: 2021-05-20 | End: 2021-05-21 | Stop reason: HOSPADM

## 2021-05-20 RX ORDER — ONDANSETRON 2 MG/ML
4 INJECTION INTRAMUSCULAR; INTRAVENOUS AS NEEDED
Status: DISCONTINUED | OUTPATIENT
Start: 2021-05-20 | End: 2021-05-20 | Stop reason: HOSPADM

## 2021-05-20 RX ORDER — LIDOCAINE HYDROCHLORIDE 20 MG/ML
INJECTION, SOLUTION EPIDURAL; INFILTRATION; INTRACAUDAL; PERINEURAL AS NEEDED
Status: DISCONTINUED | OUTPATIENT
Start: 2021-05-20 | End: 2021-05-20 | Stop reason: HOSPADM

## 2021-05-20 RX ORDER — MORPHINE SULFATE 2 MG/ML
2 INJECTION, SOLUTION INTRAMUSCULAR; INTRAVENOUS
Status: DISCONTINUED | OUTPATIENT
Start: 2021-05-20 | End: 2021-05-20 | Stop reason: HOSPADM

## 2021-05-20 RX ORDER — ONDANSETRON 2 MG/ML
4 INJECTION INTRAMUSCULAR; INTRAVENOUS
Status: DISCONTINUED | OUTPATIENT
Start: 2021-05-20 | End: 2021-05-21 | Stop reason: HOSPADM

## 2021-05-20 RX ORDER — ROPIVACAINE HYDROCHLORIDE 5 MG/ML
INJECTION, SOLUTION EPIDURAL; INFILTRATION; PERINEURAL AS NEEDED
Status: DISCONTINUED | OUTPATIENT
Start: 2021-05-20 | End: 2021-05-20 | Stop reason: HOSPADM

## 2021-05-20 RX ORDER — CEFAZOLIN SODIUM 1 G/3ML
INJECTION, POWDER, FOR SOLUTION INTRAMUSCULAR; INTRAVENOUS AS NEEDED
Status: DISCONTINUED | OUTPATIENT
Start: 2021-05-20 | End: 2021-05-20 | Stop reason: HOSPADM

## 2021-05-20 RX ORDER — ONDANSETRON 2 MG/ML
INJECTION INTRAMUSCULAR; INTRAVENOUS AS NEEDED
Status: DISCONTINUED | OUTPATIENT
Start: 2021-05-20 | End: 2021-05-20 | Stop reason: HOSPADM

## 2021-05-20 RX ORDER — DIPHENHYDRAMINE HYDROCHLORIDE 50 MG/ML
12.5 INJECTION, SOLUTION INTRAMUSCULAR; INTRAVENOUS AS NEEDED
Status: DISCONTINUED | OUTPATIENT
Start: 2021-05-20 | End: 2021-05-20 | Stop reason: HOSPADM

## 2021-05-20 RX ORDER — SODIUM CHLORIDE, SODIUM LACTATE, POTASSIUM CHLORIDE, CALCIUM CHLORIDE 600; 310; 30; 20 MG/100ML; MG/100ML; MG/100ML; MG/100ML
INJECTION, SOLUTION INTRAVENOUS
Status: DISCONTINUED | OUTPATIENT
Start: 2021-05-20 | End: 2021-05-20 | Stop reason: HOSPADM

## 2021-05-20 RX ADMIN — Medication 3 AMPULE: at 08:01

## 2021-05-20 RX ADMIN — DEXMEDETOMIDINE HYDROCHLORIDE 4 MCG: 100 INJECTION, SOLUTION, CONCENTRATE INTRAVENOUS at 09:37

## 2021-05-20 RX ADMIN — PREGABALIN 75 MG: 75 CAPSULE ORAL at 08:01

## 2021-05-20 RX ADMIN — Medication 10 ML: at 22:02

## 2021-05-20 RX ADMIN — Medication 10 ML: at 16:17

## 2021-05-20 RX ADMIN — SODIUM CHLORIDE 75 ML/HR: 900 INJECTION, SOLUTION INTRAVENOUS at 11:00

## 2021-05-20 RX ADMIN — LIDOCAINE HYDROCHLORIDE 40 ML: 20 INJECTION, SOLUTION INTRAVENOUS at 10:06

## 2021-05-20 RX ADMIN — PROPOFOL 120 MG: 10 INJECTION, EMULSION INTRAVENOUS at 09:40

## 2021-05-20 RX ADMIN — CEFAZOLIN 2 G: 1 INJECTION, POWDER, FOR SOLUTION INTRAMUSCULAR; INTRAVENOUS at 16:16

## 2021-05-20 RX ADMIN — DOCUSATE SODIUM 50MG AND SENNOSIDES 8.6MG 1 TABLET: 8.6; 5 TABLET, FILM COATED ORAL at 16:23

## 2021-05-20 RX ADMIN — CEFAZOLIN 2 G: 1 INJECTION, POWDER, FOR SOLUTION INTRAMUSCULAR; INTRAVENOUS; PARENTERAL at 09:37

## 2021-05-20 RX ADMIN — LIDOCAINE HYDROCHLORIDE 20 ML: 20 INJECTION, SOLUTION INTRAVENOUS at 09:40

## 2021-05-20 RX ADMIN — ASPIRIN 81 MG: 81 TABLET, COATED ORAL at 16:22

## 2021-05-20 RX ADMIN — SODIUM CHLORIDE, POTASSIUM CHLORIDE, SODIUM LACTATE AND CALCIUM CHLORIDE 25 ML/HR: 600; 310; 30; 20 INJECTION, SOLUTION INTRAVENOUS at 08:01

## 2021-05-20 RX ADMIN — ATORVASTATIN CALCIUM 10 MG: 10 TABLET, FILM COATED ORAL at 22:02

## 2021-05-20 RX ADMIN — SUCCINYLCHOLINE CHLORIDE 140 MG: 20 INJECTION, SOLUTION INTRAMUSCULAR; INTRAVENOUS at 09:40

## 2021-05-20 RX ADMIN — PROPOFOL 10 MG: 10 INJECTION, EMULSION INTRAVENOUS at 08:27

## 2021-05-20 RX ADMIN — PROPOFOL 20 MG: 10 INJECTION, EMULSION INTRAVENOUS at 08:28

## 2021-05-20 RX ADMIN — CELECOXIB 400 MG: 200 CAPSULE ORAL at 08:01

## 2021-05-20 RX ADMIN — DEXAMETHASONE SODIUM PHOSPHATE 4 MG: 10 INJECTION INTRAMUSCULAR; INTRAVENOUS at 09:46

## 2021-05-20 RX ADMIN — ONDANSETRON HYDROCHLORIDE 4 MG: 2 INJECTION, SOLUTION INTRAMUSCULAR; INTRAVENOUS at 09:46

## 2021-05-20 RX ADMIN — ROPIVACAINE HYDROCHLORIDE 30 ML: 5 INJECTION, SOLUTION EPIDURAL; INFILTRATION; PERINEURAL at 08:30

## 2021-05-20 RX ADMIN — Medication 1000 MG: at 08:01

## 2021-05-20 RX ADMIN — ONDANSETRON 4 MG: 2 INJECTION INTRAMUSCULAR; INTRAVENOUS at 16:17

## 2021-05-20 RX ADMIN — ACETAMINOPHEN 650 MG: 325 TABLET ORAL at 16:15

## 2021-05-20 RX ADMIN — HYDROMORPHONE HYDROCHLORIDE 0.4 MG: 2 INJECTION, SOLUTION INTRAMUSCULAR; INTRAVENOUS; SUBCUTANEOUS at 09:37

## 2021-05-20 RX ADMIN — SODIUM CHLORIDE, POTASSIUM CHLORIDE, SODIUM LACTATE AND CALCIUM CHLORIDE: 600; 310; 30; 20 INJECTION, SOLUTION INTRAVENOUS at 08:33

## 2021-05-20 NOTE — PROGRESS NOTES
Transition of Care Plan:    RUR:OBS  Disposition:Home with [facility]  Follow up appointments: PCP, Surgeon  DME needed: Pt does not use any DME currently. Transportation at Discharge: Pt's spouse will transport at d/c.  [employer] or means to access home:  Pt's  will provide. IM Medicare letter: Angelica Erickson  Caregiver Contact: Sameera Francis [phone]  Discharge Caregiver contacted prior to discharge? CM will contact cg at d/c if pt desires. Reason for Admission:  S/P Right Total Knee Arthroplasty                      RUR Score:    n/a                 Plan for utilizing home health: If recommended      PCP: First and Last name:  Nadege Quarles MD     Name of Practice:    Are you a current patient: Yes/No: yes   Approximate date of last visit: in March   Can you participate in a virtual visit with your PCP: prefers in office                    Current Advanced Directive/Advance Care Plan: Prior     Izaiah Gibson (ACP) Conversation      Date of Conversation: 5/30/21  Conducted with: Patient with Juan Antonio 153:     Click here to complete [address] including selection of the [address] Relationship (ie \"Primary\")      Content/Action Overview:   DECLINED ACP conversation - will revisit periodically   Reviewed DNR/DNI and patient elects Full Code (Attempt Resuscitation)    Length of Voluntary ACP Conversation in minutes:  <16 minutes (Non-Billable)    Flori Altamirano                              Transition of Care Plan:    Home with [facility]? CM met with pt and pt's  at bedside to introduce role, verify demographics, insurance and PCP. CM also discussed d/c plan. Pt is a 82 yo, ,  female who is under OBS at [facility] for a dx of S/P Right Total Knee Arthroplasty. Pt sees her PCP every year in office. Pt uses the [facility] in [place].   Pt lives with her spouse in a one floor home with 4 BRAD. Pt has supportive children. Pt does not use any DME but does have a walk in shower and high toilet seats. Pt does drive. Pt can complete her ADLs/IADLs independently. Pt has a hx of HH in the past.  Pt denied a hx of SNF or IPR. Pt's spouse will transport at d/c. CM will continue to assess for d/c needs. Care Management Interventions  PCP Verified by CM: Yes (Pt sees Dr. Abdulkadir Rivera.)  Palliative Care Criteria Met (RRAT>21 & CHF Dx)?: No  Mode of Transport at Discharge:  Other (see comment) (Pt's  will transport at d/c.)  Transition of Care Consult (CM Consult): Discharge Planning  Discharge Durable Medical Equipment: No (Pt does not use any DME.)  Physical Therapy Consult: Yes  Occupational Therapy Consult: No  Speech Therapy Consult: No  Current Support Network: Lives with Spouse, Own Home (Pt lives with spouse in a one floor home with 4 BRAD.)  Confirm Follow Up Transport: Self  The Patient and/or Patient Representative was Provided with a Choice of Provider and Agrees with the Discharge Plan?: Yes  Discharge Location  Discharge Placement: Home with outpatient services    Son Watson,   Care Management, [address]

## 2021-05-20 NOTE — PERIOP NOTES
Right I Pac and adductor canal block completed w/out complications noted. VSS. Pt on 2lpm NC. Pt drowsy but able to respond to verbal and nonverbal stimuli appropriately. Pt denies of any SOB or CP at this time.

## 2021-05-20 NOTE — PERIOP NOTES
Handoff Report from Operating Room to PACU  10:52 AM  Report received from [address] and NICOLA Tong regarding Heidi Louise. Surgeon(s):  Duane Mylar, MD  And Procedure(s) (LRB):  RIGHT TOTAL KNEE ARTHROPLASTY REVISION POLY SWAP (Right)  confirmed   with allergies and dressings discussed. Anesthesia type, drugs, patient history, complications, estimated blood loss, vital signs, intake and output, and last pain medication, lines and temperature were reviewed. 2:36 PM  TRANSFER - OUT REPORT:    Verbal report given to Satish RN(name) on eHidi Louise  being transferred to Ortho (unit) for routine progression of care       Report consisted of patients Situation, Background, Assessment and   Recommendations(SBAR). Information from the following report(s) SBAR, Kardex, MAR and Accordion was reviewed with the receiving nurse. Lines:   Peripheral IV 05/20/21 Left;Posterior Hand (Active)   Site Assessment Clean, dry, & intact 05/20/21 1052   Phlebitis Assessment 0 05/20/21 1052   Infiltration Assessment 0 05/20/21 1052   Dressing Status Clean, dry, & intact 05/20/21 1052   Dressing Type Transparent;Tape 05/20/21 1052   Hub Color/Line Status Pink; Infusing 05/20/21 1052        Opportunity for questions and clarification was provided. All belongings sent including eyeglasses.     Patient transported with:   Tech         updated on transfer

## 2021-05-20 NOTE — DISCHARGE INSTRUCTIONS
Discharge Instructions: [facility]  Surgery: Revision of Total Knee Replacement  Surgeon:   Nida Guillen MD  Surgery Date:  5/20/2021     To relieve pain:   Use ice/gel packs.  Put the ice pack directly over the wound, or anywhere you are hurting or swollen.  To control pain and swelling, keep ice on regularly, especially after physical activity.  The packs should stay cold for 3-4 hours. When it is not cold anymore, rotate with the packs in the freezer.  Elevate your leg. This will also keep swelling down.  Rest for at least 20 minutes between activity or exercises.  To keep track of your pain medications, write down what you take and when you take it.  The last dose of pain medication you got in the hospital was:       Medication    Dose    Date & Time           Choose your medications based on the pain scale below:     To keep your pain under control, take Tylenol every 6 hours for 14 days - even if you feel like you dont need it.  For mild to moderate pain (4-6 on pain scale), take one pain pill every 3-4 hours as needed.  For severe pain (7-10 on pain scale), take two pain pills every 3-4 hours as needed.  To prevent nausea, take your pain medications with food. Pain Scale                 As your pain lessens:     Slowly start taking less pain medication. You may do this by waiting longer between doses or by taking smaller doses.  Stop using the pain medications as soon as you no longer need it, usually in 2-3 weeks.  Aspirin   To prevent blood clots, you will need to take Aspirin 81 mg twice a day for 30 days.                To prevent stomach upset or bleeding:   Do not take non-steroidal anti-inflammatory medications (Ibuprofen, Advil, Motrin, Naproxen, etc.)    Take Pepcid 20 mg twice a day, or a similar home medication, while you are taking a blood thinner. Waterproof Dressing:     Keep your clear, waterproof dressing in place. It will be removed by your surgeon during your follow-up appointment in 2 weeks.  If you are having drainage, you may need to change your dressing. You will be given an extra dressing to use at home.  You will have some swelling, warmth, and bruising around the knee and up and down the leg after surgery. This will may get worse in the first few days you are home and will slowly get better over the next few weeks. OPTIFOAM DRESSING INSTRUCTIONS                              PRINEO DRESSING INSTRUCTIONS     Prineo is a type of skin glue and mesh that is keeping your wound together.  Leave this covering alone. Do not peel it off.  Do not apply oils or lotions over it.  You may shower with this dressing but do not soak it. Gently pat your wound dry when you get out of the shower.  DO NOTs:   Do not rub your surgical wound   Do not put lotion or oils on your wound.  Do not take a tub bath or go swimming until your doctor says it is ok.  You may shower with this dressing over your wound.  After showering pat the dressing dry.  If you have staples a home health nurse will remove them in about 10 days.  To increase and promote healing:   Stop Smoking (or at least cut back on       Smoking).  Eat a well-balanced diet (high in protein       and vitamin C).  If you have a poor appetite, drink Ensure, Glucerna, or         [place] Instant Breakfast for the next 30 days.  If you are diabetic, you should control your blood         sugars to prevent infection and help your wound         to heal.                     f you have a poor appetite, drink Ensure, Glucerna, or [place] Instant Breakfast for the next 30 days.      If you are diabetic, you should control your blood sugars to prevent infection and help your wound                                                to heal.     Prevent Infection    1. Wash your hands.  This is the most important thing you or your caregiver can do.  Wash your hands with soap and water (or use the hand  we gave you) before you touch any wounds. 2. Shower.  Use the antibacterial soap we gave you when you take a shower.  Shower with this soap until your wounds are healed. 3.  Use clean sheets.  Put freshly cleaned sheets on your bed after surgery.  To keep the surgery site clean, do not allow pets to sleep with you while your wound is still healing.  To prevent constipation, stay active and drink plenty of fluid.  While using pain medications, you should also take stool softeners and laxatives, such as Pericolace       and Miralax.  If you are having too many bowel       Movements, then you may need       to stop taking the laxatives.  You should have a bowel movement 3-4 days        after surgery and then at least every other day while       on pain medication.  To improve your recovery, you must be active!  Use your walker and take short walks (in your home)         about every 2 hours during the day.  Try to increase how far you walk each day.  You can put as much weight on your leg as you can tolerate while walking. ***       To avoid getting a stiff knee, work on getting your knee bent and straight as soon as possible.  Home health physical therapy will come to your       home the day after you leave the hospital.  The       therapist will visit about 4 times over the next        couple of weeks to teach you how to get out of        bed, to safely walk in your home, and to do your        exercises.  NO DRIVING until your surgeon tells you it is ok.  You can return to work when cleared by a physician.        Please call your physician immediately if you have:     Constant bleeding from your wound.  Increasing redness or swelling around your wound (Some warmth, bruising and swelling is normal).  Change in wound drainage (increase in amount, color, or bad smell).  Change in mental status (unusual behavior   Temperature over 101.5 degrees Fahrenheit      Pain or redness in the calf (back of your lower leg - see picture)     Increased swelling of the thigh, ankle, calf, or foot.  Emergency: CALL 911 if you have:     Shortness of breath     Chest pain when you cough or taking a deep breath     Please call your surgeons office at [phone] for a follow up appointment. _   You should call as soon as you get home or the next day after discharge. Ask to make an appointment in 2 weeks.  If you have questions or concerns during normal business hours, you may reach {[facility] BLANK LIST:[id number]::\"Dr. Shakeel Mata' Team at [phone]\",\"Dr. Blount [address] Team at [phone]\",\"LYNN Molina's Team at [phone]\",\"SA. Cintia Wyatt' Team at [phone]\",\"BOY Montgomery's Team at [phone]\",\"XC. [facility] Sick' Team at [phone]\",\"Dr. Jessica Johnson' Team at [phone]\"}.

## 2021-05-20 NOTE — H&P
Date of Surgery Update:  Randy Quijano was seen and examined on the day of surgery prior to the procedure by the surgical team.    There were no significant clinical changes since the completion of the History and Physical.    Exam today prior to surgery showed no acute cardiac findings, no respiratory difficulty, and no abdominal complaints or pain.        Signed By: Mason Hartmann PA-C    May 20, 2021 7:37 AM

## 2021-05-20 NOTE — PROGRESS NOTES
Problem: Mobility Impaired (Adult and Pediatric)  Goal: *Acute Goals and Plan of Care (Insert Text)  Description: FUNCTIONAL STATUS PRIOR TO ADMISSION: Patient was independent and active without use of DME.    HOME SUPPORT PRIOR TO ADMISSION: The patient lived with her  but did not require assist.    Physical Therapy Goals  Initiated 5/20/2021    1. Patient will move from supine to sit and sit to supine , scoot up and down, and roll side to side in bed with modified independence within 4 days. 2. Patient will perform sit to stand with modified independence within 4 days. 3. Patient will ambulate with modified independence for 150 feet with the least restrictive device within 4 days. 4. Patient will ascend/descend 5 stairs with B handrail(s) with modified independence within 4 days. 5. Patient will perform home exercise program per protocol with independence within 4 days. 6. Patient will demonstrate AROM 0-90 degrees in operative joint within 4 days. Outcome: Progressing Towards Goal   PHYSICAL THERAPY EVALUATION  Patient: Lakeisha Hogan (67 y.o. female)  Date: 5/20/2021  Primary Diagnosis: S/P total knee arthroplasty, right [Z96.651]  Procedure(s) (LRB):  RIGHT TOTAL KNEE ARTHROPLASTY REVISION POLY SWAP (Right) Day of Surgery   Precautions: WBAT       ASSESSMENT  Based on the objective data described below, the patient presents with expected post op R knee pain, impaired gait mechanics, decreased R knee ROM/strength, and decreased activity tolerance. Pt supine in bed, agreeable to PT evaluation. Pt on RA with stable O2 sats. Pt able to demonstrate mobility at sba to min a x 1(toilet transfer). Pt able to ambulate to/from bathroom with RW, cga, and verbal cues for gait sequencing and body position in RW. Pt returned supine in bed. Pt became nauseous and given an emesis bag. She was dry heaving. Pt given some ginger ale to drink.   Pt should progress well and be able to discharge home with HHPT after another 1-2 PT sessions. Current Level of Function Impacting Discharge (mobility/balance): sba bed mobility, cga transfers and amb with RW, min a x 1 toilet transfer    Functional Outcome Measure: The patient scored 60/100 on the Barthel Index outcome measure which is indicative of 40% impaired function/adls      Other factors to consider for discharge: supportive  at home     Patient will benefit from skilled therapy intervention to address the above noted impairments. PLAN :  Recommendations and Planned Interventions: bed mobility training, transfer training, gait training, therapeutic exercises, patient and family training/education, and therapeutic activities      Frequency/Duration: Patient will be followed by physical therapy:  twice daily to address goals. Recommendation for discharge: (in order for the patient to meet his/her long term goals)  Physical therapy at least 2 days/week in the home     This discharge recommendation:  A follow-up discussion with the attending provider and/or case management is planned    IF patient discharges home will need the following DME: none         SUBJECTIVE:   Patient stated I am not going home today.  The doctor said I could    OBJECTIVE DATA SUMMARY:   HISTORY:    Past Medical History:   Diagnosis Date    Arthritis     History of colonoscopy with polypectomy     benign    History of kidney stones     passed in past    Hyperlipemia     Kidney stones     Osteoporosis      Past Surgical History:   Procedure Laterality Date    HX BREAST BIOPSY Left     yrs ago (-)    HX COLONOSCOPY      HX ORTHOPAEDIC      gregorio TKR    HX ORTHOPAEDIC      rt 4th finger trigger release    HX DANNIE AND BSO      HX TONSILLECTOMY      HX UROLOGICAL      collegen implant & sling insertion       Personal factors and/or comorbidities impacting plan of care: none    Home Situation  # Steps to Enter: 5  Rails to Enter: Yes  Hand Rails : Bilateral  One/Two Story Residence: One story  Living Alone: No  Patient Expects to be Discharged to[de-identified] Private residence  Current DME Used/Available at Home: Cane, quad, Commode, bedside, Raised toilet seat, Shower chair, Walker, rolling  Tub or Shower Type: Shower    EXAMINATION/PRESENTATION/DECISION MAKING:   Critical Behavior:      Range Of Motion:  AROM: Within functional limits (R knee decreased but functional)           PROM: Within functional limits (RLE decreased but functional)           Strength:    Strength: Generally decreased, functional                    Tone & Sensation:   Tone: Normal              Sensation: Intact               Coordination:  Coordination: Within functional limits     Functional Mobility:  Bed Mobility:  Rolling: Stand-by assistance  Supine to Sit: Stand-by assistance  Sit to Supine: Stand-by assistance  Scooting: Stand-by assistance  Transfers:  Sit to Stand: Contact guard assistance  Stand to Sit: Stand-by assistance        Bed to Chair: Contact guard assistance     Balance:   Sitting: Intact  Standing: Impaired  Standing - Static: Constant support;Good  Standing - Dynamic : Constant support;Good;Fair  Ambulation/Gait Training:  Distance (ft): 20 Feet (ft) (10ft x 2)  Assistive Device: Gait belt;Walker, rolling  Ambulation - Level of Assistance: Contact guard assistance     Gait Description (WDL): Exceptions to WDL  Gait Abnormalities: Decreased step clearance; Step to gait        Base of Support: Shift to left; Widened  Stance: Right decreased  Speed/Flavia: Slow  Step Length: Left shortened;Right shortened  Swing Pattern: Right asymmetrical     Therapeutic Exercises:   Reviewed TKR exercises via handout    Functional Measure:  Barthel Index:    Bathin  Bladder: 10  Bowels: 10  Groomin  Dressin  Feeding: 10  Mobility: 0  Stairs: 5  Toilet Use: 5  Transfer (Bed to Chair and Back): 10  Total: 60/100       The Barthel ADL Index: Guidelines  1.  The index should be used as a record of what a patient does, not as a record of what a patient could do. 2. The main aim is to establish degree of independence from any help, physical or verbal, however minor and for whatever reason. 3. The need for supervision renders the patient not independent. 4. A patient's performance should be established using the best available evidence. Asking the patient, friends/relatives and nurses are the usual sources, but direct observation and common sense are also important. However direct testing is not needed. 5. Usually the patient's performance over the preceding 24-48 hours is important, but occasionally longer periods will be relevant. 6. Middle categories imply that the patient supplies over 50 per cent of the effort. 7. Use of aids to be independent is allowed. Cristóbal Hernandez., Barthel, D.W. ([id number]). Functional evaluation: the Barthel Index. [facility] (14)2. PHILIP Fernandez, Kristina Miles., [facility], [address] [facility] (1999). Measuring the change indisability after inpatient rehabilitation; comparison of the responsiveness of the Barthel Index and Functional [place] Measure. Journal of Neurology, Neurosurgery, and Psychiatry, 66(4), 099-597. Jasmin Macedo, N.J.A, JAMES Bolivar.MY.HAN, & Regino Ricardo, M.A. (2004.) Assessment of post-stroke quality of life in cost-effectiveness studies: The usefulness of the Barthel Index and the EuroQoL-5D.  Quality of Life Research, 15, 479-20           Physical Therapy Evaluation Charge Determination   History Examination Presentation Decision-Making   LOW Complexity : Zero comorbidities / personal factors that will impact the outcome / POC LOW Complexity : 1-2 Standardized tests and measures addressing body structure, function, activity limitation and / or participation in recreation  LOW Complexity : Stable, uncomplicated  LOW Complexity : FOTO score of       Based on the above components, the patient evaluation is determined to be of the following complexity level: LOW     Pain Rating:  3-4/10 R knee    Activity Tolerance:   Fair and pt with nausea and some dry heaving    After treatment patient left in no apparent distress:   Supine in bed, Call bell within reach, Caregiver / family present, and Side rails x 3    COMMUNICATION/EDUCATION:   The patients plan of care was discussed with: Registered nurse. Fall prevention education was provided and the patient/caregiver indicated understanding., Patient/family have participated as able in goal setting and plan of care. , and Patient/family agree to work toward stated goals and plan of care.     Thank you for this referral.  Raúl Razo, PT   Time Calculation: 22 mins

## 2021-05-20 NOTE — ANESTHESIA POSTPROCEDURE EVALUATION
Procedure(s):  RIGHT TOTAL KNEE ARTHROPLASTY REVISION POLY SWAP. general    Anesthesia Post Evaluation        Patient location during evaluation: PACU  Note status: Adequate. Level of consciousness: responsive to verbal stimuli and sleepy but conscious  Pain management: satisfactory to patient  Airway patency: patent  Anesthetic complications: no  Cardiovascular status: acceptable  Respiratory status: acceptable  Hydration status: acceptable  Comments: +Post-Anesthesia Evaluation and Assessment    Patient: Andrés Hill MRN: [id number]  SSN: [id number]   YOB: 1935  Age: 81 y.o. Sex: female      Cardiovascular Function/Vital Signs    /66   Pulse 68   Temp 36.4 °C (97.5 °F)   Resp 14   Ht 5' 3\" (1.6 m)   Wt 79.2 kg (174 lb 9.7 oz)   SpO2 96%   BMI 30.93 kg/m²     Patient is status post Procedure(s):  RIGHT TOTAL KNEE ARTHROPLASTY REVISION POLY SWAP. Nausea/Vomiting: Controlled. Postoperative hydration reviewed and adequate. Pain:  Pain Scale 1: Visual (05/20/21 1319)  Pain Intensity 1: 0 (05/20/21 1319)   Managed. Neurological Status:   Neuro (WDL): Exceptions to WDL (05/20/21 1052)   At baseline. Mental Status and Level of Consciousness: Arousable. Pulmonary Status:   O2 Device: None (Room air) (05/20/21 1319)   Adequate oxygenation and airway patent. Complications related to anesthesia: None    Post-anesthesia assessment completed. No concerns. Signed By: gNa Zuniga MD    5/20/2021  Post anesthesia nausea and vomiting:  controlled      INITIAL Post-op Vital signs:   Vitals Value Taken Time   /66 05/20/21 1330   Temp 35.7 °C (96.2 °F) 05/20/21 1343   Pulse 76 05/20/21 1358   Resp 15 05/20/21 1358   SpO2 96 % 05/20/21 1358   Vitals shown include unvalidated device data.

## 2021-05-20 NOTE — PROGRESS NOTES
End of Shift Note    Bedside shift change report given to Lurdes (oncoming nurse) by Renetta Anglin RN (offgoing nurse). Report included the following information SBAR    Shift worked:  3-7p     Shift summary and any significant changes:          Concerns for physician to address:       Zone phone for oncoming shift:          Activity:  Activity Level: Bed Rest  Number times ambulated in hallways past shift: 0  Number of times OOB to chair past shift: 0    Cardiac:   Cardiac Monitoring: No      Cardiac Rhythm: Sinus Rhythm    Access:   Current line(s): PIV     Genitourinary:   Urinary status: voiding    Respiratory:   O2 Device: None (Room air)  Chronic home O2 use?: NO  Incentive spirometer at bedside: NO     GI:  Last Bowel Movement Date: 05/20/21  Current diet:  DIET CLEAR LIQUID  Passing flatus: YES  Tolerating current diet: YES       Pain Management:   Patient states pain is manageable on current regimen: YES    Skin:  Peter Score: 23  Interventions: increase time out of bed    Patient Safety:  Fall Score:  Total Score: 1  Interventions: gripper socks       Length of Stay:  Expected LOS: - - -  Actual LOS: 0      Neris Chavez RN

## 2021-05-20 NOTE — H&P
Amy Lombardo MD - Adult Reconstruction and Total Joint Replacement     Orthopaedic History and Physical        NAME: Lakeisha Hogan       :  1935       MRN:  [id number]      Subjective:   Patient ID: Lakeisha Hogan is a 81 y.o. female. Chief Complaint: Pain of the Right Knee    Ms. Rohan Pena presents for an evaluation of her R knee pain. She received a R TKA in  by Dr. Layne Swartz. This knee gave her no trouble until this past winter when she stopped exercising. She denies any acute injury or aggravating incident. Her knee occasionally feels unstable. She is worried that she may have a fall soon. She feels like she has lost strength and stamina in her knee. Patient states that she also received a L TKA by Dr. Layne Swartz. She denies any trouble with this knee. Patient is a avid swimmer. She is fully vaccinated. 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 Patient Active Problem List    Diagnosis Date Noted    Nausea, vomiting and diarrhea 2011    Dehydration 2011    Gastroenteritis, acute 2011    Leukopenia 2011    Thrombocytopenia, unspecified ([facility] [address] 2011    Hypotension, unspecified 2011     Past Medical History:   Diagnosis Date    Arthritis     History of colonoscopy with polypectomy     benign    History of kidney stones     passed in past    Hyperlipemia     Kidney stones     Osteoporosis       Past Surgical History:   Procedure Laterality Date    HX BREAST BIOPSY Left     yrs ago (-)    HX COLONOSCOPY      HX ORTHOPAEDIC      gregorio TKR    HX ORTHOPAEDIC      rt 4th finger trigger release    HX DANNIE AND BSO      HX TONSILLECTOMY      HX UROLOGICAL      collegen implant & sling insertion      Prior to Admission medications    Medication Sig Start Date End Date Taking? Authorizing Provider   diazepam (VALIUM) 5 mg tablet Take 1 Tab by mouth every eight (8) hours as needed for Anxiety.  11  Yes Kira De Paz MD acetaminophen (TYLENOL) 325 mg tablet Take 325 mg by mouth every four (4) hours as needed. Indications: HEADACHE DISORDER   Yes Provider, Historical   ondansetron (ZOFRAN ODT) 4 mg disintegrating tablet Take 1 Tab by mouth every six (6) hours as needed for Nausea. 6/15/11  Yes Laurita Scheuermann, MD   simvastatin (ZOCOR) 20 mg tablet Take 20 mg by mouth nightly. Yes Other, MD Stacia   MULTIVITS W-FE,OTHER MIN (CENTRUM PO) Take 1 Tab by mouth daily. Yes Other, MD Stacia   CALCIUM CARBONATE (CALCIUM 500 PO) Take 500 mg by mouth Daily (before breakfast). Yes Other, MD Stacia   oxycodone (ROXICODONE) 5 mg immediate release tablet Take 1-3 Tabs by mouth every three (3) hours as needed for Pain. Patient not taking: Reported on 5/20/2021 8/11/11   Myles Staples MD     No Known Allergies   Social History     Tobacco Use    Smoking status: Never Smoker    Smokeless tobacco: Never Used   Substance Use Topics    Alcohol use: No      Family History   Problem Relation Age of Onset    Other Other         No cancer        REVIEW OF SYSTEMS: A comprehensive review of systems was negative except for that written in the HPI. Objective:   Constitutional: She appears stated age. Pt is cooperative and is in no acute distress. Well nourished. Well developed. Body habitus is obese. Body mass index is 31 kg/m². Gait / Assistive devices: none. Eyes: Sclera are nonicteric. Respiratory: No labored breathing. Cardiovascular: No marked edema. Well perfused extremities bilaterally. Skin: No marked skin ulcers. No lymphedema or skin abnormalities. Neurological: No marked sensory loss noted. Grossly neurovascularly intact. Both lower extremities are intact to distal sensory and motor function. Psychiatric: Alert and oriented x3. MUSCULOSKELETAL: R knee: Benign incision. Instability with flexion. Full range of motion. No effusion. Imaging:   X-ray knee right 4+ views ([id number])    Result Date: 4/29/2021  Standing. Views: AP, Lat, Langerma, Enzo Broussard. Impression: 4 views of R knee show biomet maxim CR total knee replacement with evidence of significant erosion of polyethylene posteriorly, mild subluxation. No signs of loosening. L depuy total knee replacement in good clinical alignment without complicating features. Assessment:     ICD-10-CM   1. History of total right knee replacement Z96.651     Plan: At this time, we reviewed today's films together, which showed wear of plastic liner of her R TKA. It is a biomet maxim CR total knee. I recommended quadriceps strengthening to increase stability of her leg. I also recommended that she consider replacing her plastic liner. We discussed this procedure at length today. I encouraged her to think about moving forward with this procedure sooner rather than later. She understands that she would need PCP clearance beforehand. She would like to proceed with scheduling this in late May. All questions were answered to her satisfaction. Follow up as needed. Scribed for Dr. Unique Reyes by [facility].          MACHO Calzada

## 2021-05-20 NOTE — PROGRESS NOTES
Bedside and Verbal shift change report given to [address]  (oncoming nurse) by Nino Chaves (offgoing nurse). Report included the following information SBAR, Kardex, Intake/Output, MAR and Recent Results.

## 2021-05-20 NOTE — ANESTHESIA PROCEDURE NOTES
Peripheral Block    Start time: 5/20/2021 8:26 AM  End time: 5/20/2021 8:33 AM  Performed by: Jasper Torres MD  Authorized by: Jasper Torres MD       Pre-procedure: Indications: at surgeon's request and post-op pain management    Preanesthetic Checklist: patient identified, risks and benefits discussed, site marked, timeout performed, anesthesia consent given and patient being monitored    Timeout Time: 08:26 EDT          Block Type:   Block Type:   Adductor canal and Ipack  Laterality:  Right  Monitoring:  Standard ASA monitoring, continuous pulse ox, frequent vital sign checks, heart rate, responsive to questions and oxygen  Injection Technique:  Single shot  Procedures: ultrasound guided    Patient Position: supine  Prep: chlorhexidine    Location:  Mid thigh  Needle Type:  Stimuplex  Needle Gauge:  21 G  Needle Localization:  Anatomical landmarks and ultrasound guidance    Assessment:  Number of attempts:  1  Injection Assessment:  Incremental injection every 5 mL, local visualized surrounding nerve on ultrasound, negative aspiration for blood, no paresthesia and no intravascular symptoms  Patient tolerance:  Patient tolerated the procedure well with no immediate complications

## 2021-05-20 NOTE — ANESTHESIA PREPROCEDURE EVALUATION
Relevant Problems   No relevant active problems       Anesthetic History     PONV          Review of Systems / Medical History  Patient summary reviewed, nursing notes reviewed and pertinent labs reviewed    Pulmonary  Within defined limits                 Neuro/Psych   Within defined limits           Cardiovascular  Within defined limits                Exercise tolerance: >4 METS     GI/Hepatic/Renal         Renal disease: stones       Endo/Other        Obesity and arthritis     Other Findings              Physical Exam    Airway  Mallampati: II  TM Distance: 4 - 6 cm  Neck ROM: normal range of motion   Mouth opening: Normal     Cardiovascular  Regular rate and rhythm,  S1 and S2 normal,  no murmur, click, rub, or gallop             Dental  No notable dental hx       Pulmonary  Breath sounds clear to auscultation               Abdominal  GI exam deferred       Other Findings            Anesthetic Plan    ASA: 2  Anesthesia type: general      Post-op pain plan if not by surgeon: peripheral nerve block single    Induction: Intravenous  Anesthetic plan and risks discussed with: Patient

## 2021-05-20 NOTE — PROGRESS NOTES
Ortho / Neurosurgery NP Note    POD# 0  s/p RIGHT TOTAL KNEE ARTHROPLASTY REVISION POLY SWAP   Pt seen with no visitor present. Pt resting in bed. Reports expected post-op pain, does not want to take oxycodone. States she only used tylenol with both TKAs in past  Discussed alternative of tramadol if she were to need it, pt in agreement   Complaints of HA, states this happens which she doesn't eat  Nausea with intermittent dry-heaves - RN to medicate with zofran    Encouraged to try crackers/bland food after Zofran       VSS Afebrile. Room air. Visit Vitals  BP (!) 162/86 (BP 1 Location: Left arm, BP Patient Position: At rest)   Pulse 76   Temp 98.9 °F (37.2 °C)   Resp 14   Ht 5' 3\" (1.6 m)   Wt 79.2 kg (174 lb 9.7 oz)   SpO2 97%   BMI 30.93 kg/m²       Voiding status: voiding  Output (mL)  Urine Voided: 350 ml (05/20/21 1319)      Labs    Lab Results   Component Value Date/Time    HGB 14.5 05/11/2021 08:10 AM      Lab Results   Component Value Date/Time    INR 1.0 05/11/2021 08:10 AM      Lab Results   Component Value Date/Time    Sodium 141 05/11/2021 08:10 AM    Potassium 3.6 05/11/2021 08:10 AM    Chloride 110 (H) 05/11/2021 08:10 AM    CO2 28 05/11/2021 08:10 AM    Glucose 100 05/11/2021 08:10 AM    BUN 11 05/11/2021 08:10 AM    Creatinine 0.72 05/11/2021 08:10 AM    Calcium 8.8 05/11/2021 08:10 AM     Recent Glucose Results: No results found for: GLU, GLUPOC, GLUCPOC        Body mass index is 30.93 kg/m². : A BMI > 30 is classified as obesity and > 40 is classified as morbid obesity. Ace wrap in place - c/d/i. Underlying dressing not visualized  Cryotherapy in place over incision  Calves soft and supple; No pain with passive stretch  Sensation and motor intact - PF/DF/EHL intact 5/5  SCDs for mechanical DVT proph while in bed     PLAN:  1) PT BID - WBAT. Has RW and cane at home.    2) Aspirin 81 mg PO BID for DVT Prophylaxis   3) GI Prophylaxis - Pepcid  4) Readniess for discharge:     [x] Vital Signs stable    [] Hgb stable    [x] + Voiding    [x] Wound intact, drainage minimal    [x] Tolerating PO intake     [] Cleared by PT (OT if applicable)     [] Stair training completed (if applicable)    [] Independent / Contact Guard Assist (household distance)     [] Bed mobility     [] Car transfers     [] ADLs    [x] Adequate pain control on oral medication alone     Monitor overnight. Discharge pending PT clearance. Plans to return home with support of  at discharge.      Lacy Mancia NP

## 2021-05-20 NOTE — OP NOTES
[address]  OPERATIVE REPORT    Name:  Ena Peterson  MR#:  [id number]  :  1935  ACCOUNT #:  [de-identified]  DATE OF SERVICE:  2021    PREOPERATIVE DIAGNOSIS:  Right total knee arthroplasty, polyethylene wear. POSTOPERATIVE DIAGNOSIS:  Right total knee arthroplasty, polyethylene wear. PROCEDURE PERFORMED:  Revision right total knee, one component. SURGEON:  Ceci Guzman MD    ASSISTANT:  MACHO Monaco    ANESTHESIA:  Block plus general.    COMPLICATIONS:  None. SPECIMENS REMOVED:  None. IMPLANTS:  Biomet vanguard AS tibial bearing size 12 mm x 71 mm with titanium locking bar. ESTIMATED BLOOD LOSS:  Minimal.    DRAINS:  None. CONDITION:  Stable to PACU. INDICATIONS:  This is an 70-year-old female who had previously undergone a successful right total knee arthroplasty. She presented with pain and swelling and radiographs demonstrated evidence of significant polyethylene wear. We discussed treatment options and recommended revision. The risks, benefits, and alternatives to procedure were explained to the patient and she elected to proceed. She understood no guarantees could be given about the outcome. DESCRIPTION OF PROCEDURE:  After being identified in the preop holding area and having her operative site marked, the patient was given a nerve block to good effect. She was brought back to the operating room, placed supine on standard OR table. General anesthesia was induced without difficulty. Thigh tourniquet was applied. The right lower extremity was prepped and draped in usual fashion using sterile technique. Appropriate time-out was performed. The limb was exsanguinated and the tourniquet inflated, utilized her previous incision. Full-thickness skin flaps were developed. A medial parapatellar approach was utilized. Abundant knee fluid which was benign in appearance was encountered and was evacuated.   The locking bar was removed from the tibial tray followed by the polyethylene insert. We debrided scar tissue and then trialed and selected a 12-mm replacement poly. This was inserted into the tibial tray after a thorough lavage and the locking bar placed. The knee was again taken through range of motion and found to be stable in all planes. We thoroughly lavaged once more. Routine closure in layers was performed. Sterile compressive dressings were applied. The patient was awakened, moved to stretcher, and taken to recovery room in satisfactory condition. At the conclusion of the procedure, all counts were correct. There were no immediate complications.         MD NOAM [id number]  D:  05/20/2021 10:57  T:  05/20/2021 19:39  JOB #:  [id number]

## 2021-05-21 VITALS
WEIGHT: 174.6 LBS | HEIGHT: 63 IN | OXYGEN SATURATION: 97 % | TEMPERATURE: 98 F | DIASTOLIC BLOOD PRESSURE: 77 MMHG | SYSTOLIC BLOOD PRESSURE: 147 MMHG | RESPIRATION RATE: 18 BRPM | HEART RATE: 80 BPM | BODY MASS INDEX: 30.94 KG/M2

## 2021-05-21 LAB
ANION GAP SERPL CALC-SCNC: 4 MMOL/L (ref 5–15)
BUN SERPL-MCNC: 8 MG/DL (ref 6–20)
BUN/CREAT SERPL: 13 (ref 12–20)
CALCIUM SERPL-MCNC: 8.4 MG/DL (ref 8.5–10.1)
CHLORIDE SERPL-SCNC: 107 MMOL/L (ref 97–108)
CO2 SERPL-SCNC: 28 MMOL/L (ref 21–32)
CREAT SERPL-MCNC: 0.62 MG/DL (ref 0.55–1.02)
GLUCOSE SERPL-MCNC: 88 MG/DL (ref 65–100)
HGB BLD-MCNC: 13.5 G/DL (ref 11.5–16)
POTASSIUM SERPL-SCNC: 3.8 MMOL/L (ref 3.5–5.1)
SODIUM SERPL-SCNC: 139 MMOL/L (ref 136–145)

## 2021-05-21 PROCEDURE — 85018 HEMOGLOBIN: CPT

## 2021-05-21 PROCEDURE — 74011000250 HC RX REV CODE- 250: Performed by: ORTHOPAEDIC SURGERY

## 2021-05-21 PROCEDURE — 97116 GAIT TRAINING THERAPY: CPT

## 2021-05-21 PROCEDURE — 97530 THERAPEUTIC ACTIVITIES: CPT

## 2021-05-21 PROCEDURE — 74011250637 HC RX REV CODE- 250/637: Performed by: ORTHOPAEDIC SURGERY

## 2021-05-21 PROCEDURE — 80048 BASIC METABOLIC PNL TOTAL CA: CPT

## 2021-05-21 PROCEDURE — 74011250636 HC RX REV CODE- 250/636: Performed by: PHYSICIAN ASSISTANT

## 2021-05-21 PROCEDURE — 74011250637 HC RX REV CODE- 250/637: Performed by: NURSE PRACTITIONER

## 2021-05-21 PROCEDURE — 99218 HC RM OBSERVATION: CPT

## 2021-05-21 PROCEDURE — 74011250637 HC RX REV CODE- 250/637: Performed by: PHYSICIAN ASSISTANT

## 2021-05-21 PROCEDURE — 74011250636 HC RX REV CODE- 250/636: Performed by: ORTHOPAEDIC SURGERY

## 2021-05-21 PROCEDURE — 36415 COLL VENOUS BLD VENIPUNCTURE: CPT

## 2021-05-21 RX ORDER — ASPIRIN 81 MG/1
81 TABLET ORAL 2 TIMES DAILY
Qty: 60 TABLET | Refills: 0 | Status: SHIPPED | OUTPATIENT
Start: 2021-05-21 | End: 2021-06-20

## 2021-05-21 RX ORDER — AMOXICILLIN 250 MG
1 CAPSULE ORAL 2 TIMES DAILY
Qty: 28 TABLET | Refills: 0 | Status: SHIPPED | OUTPATIENT
Start: 2021-05-21 | End: 2021-06-04

## 2021-05-21 RX ORDER — TRAMADOL HYDROCHLORIDE 50 MG/1
50 TABLET ORAL
Qty: 30 TABLET | Refills: 0 | Status: SHIPPED | OUTPATIENT
Start: 2021-05-21 | End: 2021-06-04

## 2021-05-21 RX ORDER — FAMOTIDINE 20 MG/1
20 TABLET, FILM COATED ORAL DAILY
Qty: 30 TABLET | Refills: 0 | Status: SHIPPED | OUTPATIENT
Start: 2021-05-22 | End: 2021-06-21

## 2021-05-21 RX ORDER — POLYETHYLENE GLYCOL 3350 17 G/17G
17 POWDER, FOR SOLUTION ORAL DAILY
Qty: 14 PACKET | Refills: 0 | Status: SHIPPED | OUTPATIENT
Start: 2021-05-22 | End: 2021-06-05

## 2021-05-21 RX ADMIN — DOCUSATE SODIUM 50MG AND SENNOSIDES 8.6MG 1 TABLET: 8.6; 5 TABLET, FILM COATED ORAL at 08:48

## 2021-05-21 RX ADMIN — POLYETHYLENE GLYCOL 3350 17 G: 17 POWDER, FOR SOLUTION ORAL at 08:48

## 2021-05-21 RX ADMIN — FAMOTIDINE 20 MG: 20 TABLET ORAL at 08:49

## 2021-05-21 RX ADMIN — CEFAZOLIN 2 G: 1 INJECTION, POWDER, FOR SOLUTION INTRAMUSCULAR; INTRAVENOUS at 00:25

## 2021-05-21 RX ADMIN — ASPIRIN 81 MG: 81 TABLET, COATED ORAL at 08:48

## 2021-05-21 RX ADMIN — Medication 10 ML: at 07:26

## 2021-05-21 RX ADMIN — Medication 1 AMPULE: at 08:50

## 2021-05-21 RX ADMIN — ACETAMINOPHEN 650 MG: 325 TABLET ORAL at 00:25

## 2021-05-21 RX ADMIN — ACETAMINOPHEN 650 MG: 325 TABLET ORAL at 12:26

## 2021-05-21 RX ADMIN — TRAMADOL HYDROCHLORIDE 50 MG: 50 TABLET, FILM COATED ORAL at 08:49

## 2021-05-21 RX ADMIN — ACETAMINOPHEN 650 MG: 325 TABLET ORAL at 07:26

## 2021-05-21 RX ADMIN — DEXAMETHASONE SODIUM PHOSPHATE 10 MG: 4 INJECTION, SOLUTION INTRAMUSCULAR; INTRAVENOUS at 08:49

## 2021-05-21 NOTE — PROGRESS NOTES
Problem: Mobility Impaired (Adult and Pediatric)  Goal: *Acute Goals and Plan of Care (Insert Text)  Description: FUNCTIONAL STATUS PRIOR TO ADMISSION: Patient was independent and active without use of DME.    HOME SUPPORT PRIOR TO ADMISSION: The patient lived with her  but did not require assist.    Physical Therapy Goals  Initiated 5/20/2021    1. Patient will move from supine to sit and sit to supine , scoot up and down, and roll side to side in bed with modified independence within 4 days. 2. Patient will perform sit to stand with modified independence within 4 days. 3. Patient will ambulate with modified independence for 150 feet with the least restrictive device within 4 days. 4. Patient will ascend/descend 5 stairs with B handrail(s) with modified independence within 4 days. 5. Patient will perform home exercise program per protocol with independence within 4 days. 6. Patient will demonstrate AROM 0-90 degrees in operative joint within 4 days. Outcome: Resolved/Met   PHYSICAL THERAPY TREATMENT  Patient: Alejandra Bowling (89 y.o. female)  Date: 5/21/2021  Diagnosis: S/P total knee arthroplasty, right [Z96.651] <principal problem not specified>  Procedure(s) (LRB):  RIGHT TOTAL KNEE ARTHROPLASTY REVISION POLY SWAP (Right) 1 Day Post-Op  Precautions:    Chart, physical therapy assessment, plan of care and goals were reviewed. ASSESSMENT  Patient continues with skilled PT services and is progressing towards goals. Pt presents with decreased strength and ROM. Pt performed bed mobility at [facility] with additional time. Pt performed sit to stand transfer at [facility]/Supervision. Pt ambulated 450ft with RW at [facility]. With time pt improved to step through gait with good heel strike. Pt ascended/descended 4 steps with both railings at [facility] with cueing for sequencing. Pt performed a car transfer at [place] [address] with cueing for sequencing. Pt moving well with no safety concerns.  From physical therapy stand point pt cleared for discharge. Current Level of Function Impacting Discharge (mobility/balance): mobility at [facility]     Other factors to consider for discharge: none         PLAN :  Patient continues to benefit from skilled intervention to address the above impairments. Continue treatment per established plan of care. to address goals. Recommendation for discharge: (in order for the patient to meet his/her long term goals)  Physical therapy at least 2 days/week in the home     This discharge recommendation:  Has been made in collaboration with the attending provider and/or case management    IF patient discharges home will need the following DME: patient owns DME required for discharge       SUBJECTIVE:   Patient stated Tejinder Gunderson been doing pool therapy for a while.     OBJECTIVE DATA SUMMARY:   Critical Behavior:  Neurologic State: Alert, Appropriate for age  Orientation Level: Appropriate for age, Oriented X4          Range of Motion:      AROM: Generally decreased, Functional                       Functional Mobility Training:  Bed Mobility:  Rolling: Stand-by assistance  Supine to Sit: Stand-by assistance     Scooting: Stand-by assistance        Transfers:  Sit to Stand: Contact guard assistance;Stand-by assistance  Stand to Sit: Stand-by assistance        Bed to Chair: Stand-by assistance;Contact guard assistance                    Balance:  Sitting: Intact  Standing: Intact; With support  Standing - Static: Good;Constant support  Standing - Dynamic : Fair;Good;Constant support  Ambulation/Gait Training:  Distance (ft): 450 Feet (ft)  Assistive Device: Gait belt;Walker, rolling  Ambulation - Level of Assistance: Contact guard assistance;Stand-by assistance        Gait Abnormalities: Decreased step clearance; Antalgic        Base of Support: Widened     Speed/Flavia: Pace decreased (<100 feet/min)  Step Length: Right shortened;Left shortened     Stairs:  Number of Stairs Trained: 4  Stairs - Level of Assistance: Contact guard assistance   Rail Use: Both    Therapeutic Exercises:   Pt independent with exercises. Pain Rating:  Pt reported minimal pain     Activity Tolerance:   WNL and Good    After treatment patient left in no apparent distress:   Sitting in chair, Call bell within reach, and Caregiver / family present    COMMUNICATION/COLLABORATION:   The patients plan of care was discussed with: Registered nurse.      Maria Luisa Garcia PTA   Time Calculation: 27 mins

## 2021-05-21 NOTE — DISCHARGE SUMMARY
Ortho Discharge Summary    Patient ID:  Lakeisha Hogan  [id number]  female  81 y.o.  1935    Admit date: 5/20/2021    Discharge date: 5/21/2021    Admitting Physician: Amy Lombardo MD     Consulting Physician(s):   Treatment Team: Attending Provider: Hermes Armstrong MD; Care Manager: Dav Kang; Utilization Review: Consuelo Carpenter RN    Date of Surgery:   5/20/2021     Preoperative Diagnosis:  RIGHT KNEE LOOSENING    Postoperative Diagnosis:   FAILED IMPLANT    Procedure(s):   RIGHT TOTAL KNEE ARTHROPLASTY REVISION POLY SWAP     Anesthesia Type:   General     Surgeon: Hermes Armstrong MD                            HPI:  Pt is a 81 y.o. female who has a history of RIGHT KNEE LOOSENING  with pain and limitations of activities of daily living who presents at this time for a RIGHT TOTAL KNEE ARTHROPLASTY REVISION POLY SWAP following the failure of conservative management. PMH:   Past Medical History:   Diagnosis Date    Arthritis     History of colonoscopy with polypectomy     benign    History of kidney stones     passed in past    Hyperlipemia     Kidney stones     Osteoporosis        Body mass index is 30.93 kg/m². : A BMI > 30 is classified as obesity and > 40 is classified as morbid obesity. Medications upon admission :   Prior to Admission Medications   Prescriptions Last Dose Informant Patient Reported? Taking? CALCIUM CARBONATE (CALCIUM 500 PO) 5/19/2021 at Unknown time  Yes Yes   Sig: Take 500 mg by mouth Daily (before breakfast). MULTIVITS W-FE,OTHER MIN (CENTRUM PO) 5/19/2021 at Unknown time  Yes Yes   Sig: Take 1 Tab by mouth daily. acetaminophen (TYLENOL) 325 mg tablet 4/20/2021 at Unknown time  Yes Yes   Sig: Take 325 mg by mouth every four (4) hours as needed. Indications: HEADACHE DISORDER   diazepam (VALIUM) 5 mg tablet 4/20/2021 at Unknown time  No Yes   Sig: Take 1 Tab by mouth every eight (8) hours as needed for Anxiety.    ondansetron (ZOFRAN ODT) 4 mg disintegrating tablet 4/20/2021 at Unknown time  No Yes   Sig: Take 1 Tab by mouth every six (6) hours as needed for Nausea. oxycodone (ROXICODONE) 5 mg immediate release tablet Not Taking at Unknown time  No No   Sig: Take 1-3 Tabs by mouth every three (3) hours as needed for Pain. Patient not taking: Reported on 5/20/2021   simvastatin (ZOCOR) 20 mg tablet 5/19/2021 at Unknown time  Yes Yes   Sig: Take 20 mg by mouth nightly. Facility-Administered Medications: None        Allergies:  No Known Allergies     Hospital Course: The patient underwent surgery. Complications:  None; patient tolerated the procedure well. Was taken to the PACU in stable condition and then transferred to the ortho floor. Perioperative Antibiotics:  Ancef     Postoperative Pain Management:  Tramadol & Tylenol     DVT Prophylaxis: Aspirin 81 BID    Postoperative transfusions:    Number of units banked PRBCs =   none     Post Op complications: none    Hemoglobin at discharge:    Lab Results   Component Value Date/Time    HGB 13.5 05/21/2021 04:29 AM    INR 1.0 05/11/2021 08:10 AM       Silver dressing with tegarderm remained clean, dry and intact. No significant erythema or swelling. Wound appears to be healing without any evidence of infection. Neurovascular exam found to be within normal limits. Physical Therapy started following surgery and participated in bed mobility, transfers and ambulation. Gait:  Gait  Base of Support: Shift to left, Widened  Speed/Flavia: Slow  Step Length: Left shortened, Right shortened  Swing Pattern: Right asymmetrical  Stance: Right decreased  Gait Abnormalities: Decreased step clearance, Step to gait  Ambulation - Level of Assistance: Contact guard assistance  Distance (ft): 20 Feet (ft) (10ft x 2)  Assistive Device: Gait belt, Walker, rolling                   Discharged to: Home.     Condition on Discharge:   stable    Discharge instructions:  - Anticoagulate with Aspirin 81 BID  - Take pain medications as prescribed  - Resume pre hospital diet      - Discharge activity: activity as tolerated  - Ambulate with assistive device as needed. - Weight bearing status - WBAT  - Wound Care Keep wound clean and dry. See discharge instruction sheet. -DISCHARGE MEDICATION LIST     Current Discharge Medication List      START taking these medications    Details   aspirin delayed-release 81 mg tablet Take 1 Tablet by mouth two (2) times a day for 30 days. Qty: 60 Tablet, Refills: 0  Start date: 5/21/2021, End date: 6/20/2021      famotidine (PEPCID) 20 mg tablet Take 1 Tablet by mouth daily for 30 days. Qty: 30 Tablet, Refills: 0  Start date: 5/22/2021, End date: 6/21/2021      polyethylene glycol (MIRALAX) 17 gram packet Take 1 Packet by mouth daily for 14 days. Qty: 14 Packet, Refills: 0  Start date: 5/22/2021, End date: 6/5/2021      senna-docusate (PERICOLACE) 8.6-50 mg per tablet Take 1 Tablet by mouth two (2) times a day for 14 days. Qty: 28 Tablet, Refills: 0  Start date: 5/21/2021, End date: 6/4/2021      traMADoL (ULTRAM) 50 mg tablet Take 1 Tablet by mouth every six (6) hours as needed for Pain for up to 14 days. Max Daily Amount: 200 mg. Qty: 30 Tablet, Refills: 0  Start date: 5/21/2021, End date: 6/4/2021    Associated Diagnoses: S/P revision of total knee, right         CONTINUE these medications which have NOT CHANGED    Details   diazepam (VALIUM) 5 mg tablet Take 1 Tab by mouth every eight (8) hours as needed for Anxiety. Qty: 90 Tab, Refills: 3      acetaminophen (TYLENOL) 325 mg tablet Take 325 mg by mouth every four (4) hours as needed. Indications: HEADACHE DISORDER      ondansetron (ZOFRAN ODT) 4 mg disintegrating tablet Take 1 Tab by mouth every six (6) hours as needed for Nausea. Qty: 10 Tab, Refills: 1      simvastatin (ZOCOR) 20 mg tablet Take 20 mg by mouth nightly. MULTIVITS W-FE,OTHER MIN (CENTRUM PO) Take 1 Tab by mouth daily.       CALCIUM CARBONATE (CALCIUM 500 PO) Take 500 mg by mouth Daily (before breakfast).          STOP taking these medications       oxycodone (ROXICODONE) 5 mg immediate release tablet Comments:   Reason for Stopping:            per medical continuation form      -Follow up in office in 2 weeks      Signed:  Sal Hickey NP  Orthopaedic Nurse Practitioner    5/21/2021  11:59 AM

## 2021-05-21 NOTE — PROGRESS NOTES
DISCHARGE NOTE FROM [facility]    Patient determined to be stable for discharge by attending provider. I have reviewed the discharge instructions with the patient. They verbalized understanding and all questions were answered to their satisfaction. No complaints or further questions were expressed. Medications sent to [address] pharmacy. Appropriate educational materials and medication side effect teaching were provided. PIV were removed prior to discharge. Patient did not discharge with any line, bourgeois, or drain. The following personal items collected during admission were returned to the patient prior to discharge: clothing    Post-op patient: Yes- Patient given post-op discharge kit and instructed on use.        Aspen Dejesus RN

## 2021-05-21 NOTE — PROGRESS NOTES
Problem: Falls - Risk of  Goal: *Absence of Falls  Description: Document Ayesha Rosas Fall Risk and appropriate interventions in the flowsheet.   Outcome: Progressing Towards Goal  Note: Fall Risk Interventions:  Mobility Interventions: Communicate number of staff needed for ambulation/transfer, OT consult for ADLs, Patient to call before getting OOB, PT Consult for mobility concerns, PT Consult for assist device competence, Utilize walker, cane, or other assistive device         Medication Interventions: Assess postural VS orthostatic hypotension, Bed/chair exit alarm, Evaluate medications/consider consulting pharmacy, Patient to call before getting OOB    Elimination Interventions: Call light in reach, Patient to call for help with toileting needs              Problem: Patient Education: Go to Patient Education Activity  Goal: Patient/Family Education  Outcome: Progressing Towards Goal     Problem: Knee Replacement: Day of Surgery/Unit  Goal: Activity/Safety  Outcome: Progressing Towards Goal  Goal: Nutrition/Diet  Outcome: Progressing Towards Goal  Goal: Medications  Outcome: Progressing Towards Goal  Goal: Respiratory  Outcome: Progressing Towards Goal  Goal: Psychosocial  Outcome: Progressing Towards Goal  Goal: *Optimal pain control at patient's stated goal  Outcome: Progressing Towards Goal  Goal: *Hemodynamically stable  Outcome: Progressing Towards Goal

## 2021-05-21 NOTE — PROGRESS NOTES
Bedside and Verbal shift change report given to [place] (oncoming nurse) by Jene Osler (offgoing nurse). Report included the following information SBAR, Kardex, Intake/Output and MAR.

## 2021-05-21 NOTE — PROGRESS NOTES
Ortho / Neurosurgery NP Note    POD# 1  s/p RIGHT TOTAL KNEE ARTHROPLASTY REVISION POLY SWAP   Pt seen with no visitor present. Pt resting in bed. Awake and alert. Feeling well. Reports minimal post-op pain 3/10 controlled with tylenol alone   Pt in agreement of having tramadol for home if needed. Denies HA today, nausea resolved. Tolerating regular diet. Has been up walking to bathroom overnight, tolerating activity well, denies dizziness. VSS Afebrile. Room air. Visit Vitals  BP (!) 159/72 (BP 1 Location: Right arm, BP Patient Position: At rest)   Pulse 78   Temp 98 °F (36.7 °C)   Resp 18   Ht 5' 3\" (1.6 m)   Wt 79.2 kg (174 lb 9.7 oz)   SpO2 99%   BMI 30.93 kg/m²       Voiding status: voiding  Output (mL)  Urine Voided: 200 ml (05/20/21 1948)  Last Bowel Movement Date: 05/20/21 (05/20/21 1455)      Labs    Lab Results   Component Value Date/Time    HGB 13.5 05/21/2021 04:29 AM      Lab Results   Component Value Date/Time    INR 1.0 05/11/2021 08:10 AM      Lab Results   Component Value Date/Time    Sodium 139 05/21/2021 04:29 AM    Potassium 3.8 05/21/2021 04:29 AM    Chloride 107 05/21/2021 04:29 AM    CO2 28 05/21/2021 04:29 AM    Glucose 88 05/21/2021 04:29 AM    BUN 8 05/21/2021 04:29 AM    Creatinine 0.62 05/21/2021 04:29 AM    Calcium 8.4 (L) 05/21/2021 04:29 AM     Recent Glucose Results:   Lab Results   Component Value Date/Time    GLU 88 05/21/2021 04:29 AM           Body mass index is 30.93 kg/m². : A BMI > 30 is classified as obesity and > 40 is classified as morbid obesity. Silver dressing with tegaderm c/d/i. Cryotherapy in place over incision  Calves soft and supple; No pain with passive stretch  Sensation and motor intact - PF/DF/EHL intact 5/5  SCDs for mechanical DVT proph while in bed     PLAN:  1) PT BID - WBAT. Has RW and cane at home.    2) Aspirin 81 mg PO BID for DVT Prophylaxis   3) GI Prophylaxis - Pepcid  4) Readniess for discharge:     [x] Vital Signs stable [x] Hgb stable    [x] + Voiding    [x] Wound intact, drainage minimal    [x] Tolerating PO intake     [] Cleared by PT (OT if applicable)     [] Stair training completed (if applicable)    [] Independent / Contact Guard Assist (household distance)     [] Bed mobility     [] Car transfers     [] ADLs    [x] Adequate pain control on oral medication alone     Discharge home today pending PT clearance. Plans to return home with support of  at discharge.      Hanh Steinberg NP

## 2021-05-21 NOTE — PROGRESS NOTES
Transition of Care Plan:     RUR:OBS  Disposition:Home with [facility]  Follow up appointments: PCP, Surgeon  DME needed: Pt does not use any DME currently. Transportation at Discharge: Pt's spouse will transport at d/c.  [address] or means to access home:  Pt's  will provide. IM Medicare letter: Ji Whitlock  Caregiver Contact: Rakesh Larsen [phone]  Discharge Caregiver contacted prior to discharge? CM will contact cg at d/c if pt desires. 12:17 p.m. CM asked which company pt wanted to go with and pt picked [facility]. Pt will place on AVS.    CM met with pt to discuss [facility]. Pt in agreement. Pt signed FOC. CM sent referrals.       Joao Méndez,   Care Management, [address]

## 2022-01-05 ENCOUNTER — TRANSCRIBE ORDER (OUTPATIENT)
Dept: SCHEDULING | Age: 87
End: 2022-01-05

## 2022-01-05 DIAGNOSIS — Z12.31 VISIT FOR SCREENING MAMMOGRAM: Primary | ICD-10-CM

## 2022-02-11 ENCOUNTER — HOSPITAL ENCOUNTER (OUTPATIENT)
Dept: MAMMOGRAPHY | Age: 87
Discharge: HOME | End: 2022-02-11
Attending: FAMILY MEDICINE
Payer: MEDICARE

## 2022-02-11 DIAGNOSIS — Z12.31 VISIT FOR SCREENING MAMMOGRAM: ICD-10-CM

## 2022-02-11 PROCEDURE — 77067 SCR MAMMO BI INCL CAD: CPT

## 2022-03-18 ENCOUNTER — OFFICE VISIT (OUTPATIENT)
Dept: ORTHOPEDIC SURGERY | Age: 87
End: 2022-03-18
Payer: MEDICARE

## 2022-03-18 VITALS — WEIGHT: 165 LBS | HEIGHT: 64 IN | BODY MASS INDEX: 28.17 KG/M2

## 2022-03-18 DIAGNOSIS — M19.011 PRIMARY OSTEOARTHRITIS, RIGHT SHOULDER: Primary | ICD-10-CM

## 2022-03-18 DIAGNOSIS — G89.29 CHRONIC RIGHT SHOULDER PAIN: ICD-10-CM

## 2022-03-18 DIAGNOSIS — M25.511 CHRONIC RIGHT SHOULDER PAIN: ICD-10-CM

## 2022-03-18 PROCEDURE — G8536 NO DOC ELDER MAL SCRN: HCPCS | Performed by: ORTHOPAEDIC SURGERY

## 2022-03-18 PROCEDURE — G8432 DEP SCR NOT DOC, RNG: HCPCS | Performed by: ORTHOPAEDIC SURGERY

## 2022-03-18 PROCEDURE — 99214 OFFICE O/P EST MOD 30 MIN: CPT | Performed by: ORTHOPAEDIC SURGERY

## 2022-03-18 PROCEDURE — G8419 CALC BMI OUT NRM PARAM NOF/U: HCPCS | Performed by: ORTHOPAEDIC SURGERY

## 2022-03-18 PROCEDURE — 20610 DRAIN/INJ JOINT/BURSA W/O US: CPT | Performed by: ORTHOPAEDIC SURGERY

## 2022-03-18 PROCEDURE — G8400 PT W/DXA NO RESULTS DOC: HCPCS | Performed by: ORTHOPAEDIC SURGERY

## 2022-03-18 PROCEDURE — G8427 DOCREV CUR MEDS BY ELIG CLIN: HCPCS | Performed by: ORTHOPAEDIC SURGERY

## 2022-03-18 PROCEDURE — 1101F PT FALLS ASSESS-DOCD LE1/YR: CPT | Performed by: ORTHOPAEDIC SURGERY

## 2022-03-18 PROCEDURE — 1090F PRES/ABSN URINE INCON ASSESS: CPT | Performed by: ORTHOPAEDIC SURGERY

## 2022-03-18 RX ORDER — BUPIVACAINE HYDROCHLORIDE 2.5 MG/ML
6 INJECTION, SOLUTION INFILTRATION; PERINEURAL ONCE
Status: COMPLETED | OUTPATIENT
Start: 2022-03-18 | End: 2022-03-18

## 2022-03-18 RX ORDER — METHYLPREDNISOLONE ACETATE 80 MG/ML
80 INJECTION, SUSPENSION INTRA-ARTICULAR; INTRALESIONAL; INTRAMUSCULAR; SOFT TISSUE ONCE
Status: COMPLETED | OUTPATIENT
Start: 2022-03-18 | End: 2022-03-18

## 2022-03-18 RX ADMIN — BUPIVACAINE HYDROCHLORIDE 15 MG: 2.5 INJECTION, SOLUTION INFILTRATION; PERINEURAL at 09:13

## 2022-03-18 RX ADMIN — METHYLPREDNISOLONE ACETATE 80 MG: 80 INJECTION, SUSPENSION INTRA-ARTICULAR; INTRALESIONAL; INTRAMUSCULAR; SOFT TISSUE at 09:13

## 2022-03-18 NOTE — PROGRESS NOTES
Alondra Ortiz (: 1935) is a 81 y.o. female, established patient, here for evaluation of the following chief complaint(s):  Shoulder Pain (left)       ASSESSMENT/PLAN:  Below is the assessment and plan developed based on review of pertinent history, physical exam, labs, studies, and medications. She elected to try corticosteroid injection for the right shoulder today. 1. Primary osteoarthritis, right shoulder  -     bupivacaine HCl (MARCAINE) 0.25 % (2.5 mg/mL) injection 15 mg; 15 mg (6 mL), Other, ONCE, 1 dose, On Fri 3/18/22 at 1000  -     methylPREDNISolone acetate (DEPO-MEDROL) 80 mg/mL injection 80 mg; 80 mg, Intra artICUlar, ONCE, 1 dose, On Fri 3/18/22 at 1000  2. Chronic right shoulder pain  -     XR SHOULDER RT AP/LAT MIN 2 V; Future  -     bupivacaine HCl (MARCAINE) 0.25 % (2.5 mg/mL) injection 15 mg; 15 mg (6 mL), Other, ONCE, 1 dose, On Fri 3/18/22 at 1000  -     methylPREDNISolone acetate (DEPO-MEDROL) 80 mg/mL injection 80 mg; 80 mg, Intra artICUlar, ONCE, 1 dose, On Fri 3/18/22 at 1000      Return in about 3 months (around 2022). SUBJECTIVE/OBJECTIVE:  Alondra Ortiz (: 1935) is a 81 y.o. female. She notes aching pain in the right shoulder for a number of months to a year. She denies any specific injury but tries to stay active in swimming. She notes difficulty with her swim strokes because of her right shoulder pain and limited motion. No Known Allergies    Current Outpatient Medications   Medication Sig    diazepam (VALIUM) 5 mg tablet Take 1 Tab by mouth every eight (8) hours as needed for Anxiety.  acetaminophen (TYLENOL) 325 mg tablet Take 325 mg by mouth every four (4) hours as needed. Indications: HEADACHE DISORDER    ondansetron (ZOFRAN ODT) 4 mg disintegrating tablet Take 1 Tab by mouth every six (6) hours as needed for Nausea.  simvastatin (ZOCOR) 20 mg tablet Take 20 mg by mouth nightly.     MULTIVITS W-FE,OTHER MIN (CENTRUM PO) Take 1 Tab by mouth daily.  CALCIUM CARBONATE (CALCIUM 500 PO) Take 500 mg by mouth Daily (before breakfast). Current Facility-Administered Medications   Medication    bupivacaine HCl (MARCAINE) 0.25 % (2.5 mg/mL) injection 15 mg    methylPREDNISolone acetate (DEPO-MEDROL) 80 mg/mL injection 80 mg       Social History     Socioeconomic History    Marital status:      Spouse name: Not on file    Number of children: Not on file    Years of education: Not on file    Highest education level: Not on file   Occupational History    Not on file   Tobacco Use    Smoking status: Never Smoker    Smokeless tobacco: Never Used   Substance and Sexual Activity    Alcohol use: No    Drug use: No    Sexual activity: Not on file   Other Topics Concern     Service Not Asked    Blood Transfusions Not Asked    Caffeine Concern Not Asked    Occupational Exposure Not Asked    Hobby Hazards Not Asked    Sleep Concern Not Asked    Stress Concern Not Asked    Weight Concern Not Asked    Special Diet Not Asked    Back Care Not Asked    Exercise Not Asked    Bike Helmet Not Asked    Seat Belt Not Asked    Self-Exams Not Asked   Social History Narrative    Not on file     Social Determinants of Health     Financial Resource Strain:     Difficulty of Paying Living Expenses: Not on file   Food Insecurity:     Worried About Running Out of Food in the Last Year: Not on file    Jim of Food in the Last Year: Not on file   Transportation Needs:     Lack of Transportation (Medical): Not on file    Lack of Transportation (Non-Medical):  Not on file   Physical Activity:     Days of Exercise per Week: Not on file    Minutes of Exercise per Session: Not on file   Stress:     Feeling of Stress : Not on file   Social Connections:     Frequency of Communication with Friends and Family: Not on file    Frequency of Social Gatherings with Friends and Family: Not on file    Attends [employer] Services: Not on file   [facility] [employer] or Organizations: Not on file    Attends Club or Organization Meetings: Not on file    Marital Status: Not on file   Intimate Partner Violence:     Fear of Current or Ex-Partner: Not on file    Emotionally Abused: Not on file    Physically Abused: Not on file    Sexually Abused: Not on file   Housing Stability:     Unable to Pay for Housing in the Last Year: Not on file    Number of [place] in the Last Year: Not on file    Unstable Housing in the Last Year: Not on file       Past Surgical History:   Procedure Laterality Date    HX BREAST BIOPSY Left 1980s?    yrs ago (-) cyst    HX COLONOSCOPY      HX ORTHOPAEDIC      gregorio TKR    HX ORTHOPAEDIC      rt 4th finger trigger release    HX DANNIE AND BSO      HX TONSILLECTOMY      HX UROLOGICAL      collegen implant & sling insertion       Family History   Problem Relation Age of Onset    Other Other         No cancer        OB History        2    Para   2    Term   2            AB        Living           SAB        IAB        Ectopic        Molar        Multiple        Live Births   2                   REVIEW OF SYSTEMS:  ROS     Positive for: Musculoskeletal    Last edited by Rocklin Shone on 3/18/2022  8:42 AM. (History)        Patient denies any recent fever, chills, nausea, vomiting, chest pain, or shortness of breath. Vitals:  Ht 5' 4\" (1.626 m)   Wt 165 lb (74.8 kg)   BMI 28.32 kg/m²    Body mass index is 28.32 kg/m². PHYSICAL EXAM:  General exam: Patient is awake, alert, and oriented x3. Well-appearing. No acute distress. Ambulates with a normal gait. Right shoulder: Neurovascular and sensory intact. There is decreased range of motion in all planes on active and passive exam.  There is crepitus with range of motion of the shoulder.   There is pain with impingement testing including Man exam.  There is weakness noted with resisted abduction and resisted external rotation on exam.  Normal stability is noted. IMAGING:    XR Results (most recent):  Results from Appointment encounter on 03/18/22    XR SHOULDER RT AP/LAT MIN 2 V    Narrative  X-rays of the right shoulder 3 views done today show evidence of glenohumeral joint space narrowing and osteophyte formation. Results from [place] encounter on 08/09/11    XR SPINE CERVICAL PA LAT [facility]  2/3 VIEWS    Narrative  **Final Report**      ICD Codes / Adm. Diagnosis: 805.00  276.51 / C7 FRACTURE  C7 FRACTURE  Examination:  CR C SPINE 2 OR 3 [facility]  - [id number] - Aug  9 2011  9:28PM  Accession No:  [id number]  Reason:  s/p cervical fusion      REPORT:  INDICATION:  s/p cervical fusion    EXAM: AP and lateral views cervical spine. Comparison interoperative view  same day    FINDINGS: Patient is post anterior decompression and fusion at C6-C7. Alignment is normal through C7. Visualization of T1 is difficult due to  overlying soft tissues. There are postsurgical changes in the anterior soft  tissues. The known cervical spine fractures not visualized on these limited  postoperative films. IMPRESSION:  1. Status post C6-C7 anterior decompression and fusion        Signing/Reading Doctor: Ellie Morrison ([id number])  Transcribed:  on 08/09/2011  Approved: Sergo ACKERMAN ([id number])  08/09/2011        Distribution:  Attending Doctor: Ladonna Zeng  Alternate Doctor: Ladonna Zeng      XR SPINE CERVICAL PA LAT [place]  2/3 VIEWS    Narrative  **Final Report**      ICD Codes / Adm. Diagnosis: 805.00  276.51 / C7 FRACTURE  C7 FRACTURE  Examination:  CR C SPINE 2 OR 3 [facility]  - [id number] - Aug  9 2011  8:53PM  Accession No:  [id number]  Reason:  surgery      REPORT:  INDICATION:  surgery    EXAM: AP and lateral views of the cervical spine were obtained in the  operating room. Comparison same day and 10/05/2011    FINDINGS: Patient is post anterior decompression and fusion at C6-C7. Alignment is normal. No complicating features. IMPRESSION:  1. Normal alignment post C6-C7 anterior decompression and fusion        Signing/Reading Doctor: Fabiano Shepherd ([id number])  Transcribed:  on 08/09/2011  Approved: Koffi ACKERMAN ([id number])  08/09/2011        Distribution:  Attending Doctor: Dayana Taylor  Alternate Doctor: Dayana Taylor         Orders Placed This Encounter    XR SHOULDER RT AP/LAT MIN 2 V     3     Standing Status:   Future     Number of Occurrences:   1     Standing Expiration Date:   9/18/2022    bupivacaine HCl (MARCAINE) 0.25 % (2.5 mg/mL) injection 15 mg    methylPREDNISolone acetate (DEPO-MEDROL) 80 mg/mL injection 80 mg              An electronic signature was used to authenticate this note.   -- Lucio Luevano, DO bilateral lower extremity strength 3 to 3+/5 throughout, assessed in sitting/grossly assessed due to

## 2022-03-18 NOTE — LETTER
3/18/2022    Patient: Kimberly Haro   YOB: 1935   Date of Visit: 3/18/2022     [address]edra [address] Right Flank   [address]  [phone]  Via Fax: [phone]    Dear Noman Leo MD,      Thank you for referring Ms. Emma Burdick to [place] for evaluation. My notes for this consultation are attached. If you have questions, please do not hesitate to call me. I look forward to following your patient along with you.       Sincerely,    Elle Smith, DO

## 2022-03-19 PROBLEM — Z96.651 S/P TOTAL KNEE ARTHROPLASTY, RIGHT: Status: ACTIVE | Noted: 2021-05-20

## 2023-01-16 ENCOUNTER — TRANSCRIBE ORDER (OUTPATIENT)
Dept: SCHEDULING | Age: 88
End: 2023-01-16

## 2023-01-16 DIAGNOSIS — Z12.31 VISIT FOR SCREENING MAMMOGRAM: Primary | ICD-10-CM

## 2023-02-28 ENCOUNTER — HOSPITAL ENCOUNTER (OUTPATIENT)
Dept: MAMMOGRAPHY | Age: 88
Discharge: HOME | End: 2023-02-28
Attending: FAMILY MEDICINE
Payer: MEDICARE

## 2023-02-28 DIAGNOSIS — Z12.31 VISIT FOR SCREENING MAMMOGRAM: ICD-10-CM

## 2023-02-28 PROCEDURE — 77063 BREAST TOMOSYNTHESIS BI: CPT

## 2024-01-26 ENCOUNTER — TRANSCRIBE ORDERS (OUTPATIENT)
Facility: HOSPITAL | Age: 89
End: 2024-01-26

## 2024-01-26 DIAGNOSIS — Z12.31 BREAST CANCER SCREENING BY MAMMOGRAM: Primary | ICD-10-CM

## 2024-03-04 ENCOUNTER — HOSPITAL ENCOUNTER (OUTPATIENT)
Facility: HOSPITAL | Age: 89
Discharge: HOME | End: 2024-03-07
Attending: FAMILY MEDICINE
Payer: MEDICARE

## 2024-03-04 VITALS — BODY MASS INDEX: 26.03 KG/M2 | WEIGHT: 162 LBS | HEIGHT: 66 IN

## 2024-03-04 DIAGNOSIS — Z12.31 BREAST CANCER SCREENING BY MAMMOGRAM: ICD-10-CM

## 2024-03-04 PROCEDURE — 77063 BREAST TOMOSYNTHESIS BI: CPT

## 2024-11-14 PROBLEM — M81.0 AGE-RELATED OSTEOPOROSIS WITHOUT CURRENT PATHOLOGICAL FRACTURE: Status: ACTIVE | Noted: 2024-11-14

## 2024-11-22 ENCOUNTER — HOSPITAL ENCOUNTER (OUTPATIENT)
Facility: HOSPITAL | Age: 89
Setting detail: INFUSION SERIES
End: 2024-11-22

## 2024-11-22 DIAGNOSIS — M81.0 AGE-RELATED OSTEOPOROSIS WITHOUT CURRENT PATHOLOGICAL FRACTURE: Primary | ICD-10-CM

## 2025-03-20 ENCOUNTER — HOSPITAL ENCOUNTER (OUTPATIENT)
Facility: HOSPITAL | Age: 89
Discharge: HOME | End: 2025-03-23
Attending: FAMILY MEDICINE
Payer: MEDICARE

## 2025-03-20 VITALS — HEIGHT: 66 IN | BODY MASS INDEX: 26.03 KG/M2 | WEIGHT: 162 LBS

## 2025-03-20 DIAGNOSIS — Z12.31 OTHER SCREENING MAMMOGRAM: ICD-10-CM

## 2025-03-20 PROCEDURE — 77063 BREAST TOMOSYNTHESIS BI: CPT

## (undated) DEVICE — SUTURE VCRL SZ 1 L27IN ABSRB VLT L36MM CT-1 1/2 CIR J341H

## (undated) DEVICE — TRAP FLUID BUFFALO FLTR

## (undated) DEVICE — 3M™ IOBAN™ 2 ANTIMICROBIAL INCISE DRAPE 6650EZ: Brand: IOBAN™ 2

## (undated) DEVICE — PREP SKN CHLRAPRP APL 26ML STR --

## (undated) DEVICE — PREP KIT PEEL PTCH POVIDONE IOD

## (undated) DEVICE — Z DISCONTINUED USE 2717541 SUTURE STRATAFIX SZ 3-0 L30CM NONABSORBABLE UD L26MM FS 3/8

## (undated) DEVICE — CUSTOM CAST PD STR

## (undated) DEVICE — DRAPE,REIN 53X77,STERILE: Brand: MEDLINE

## (undated) DEVICE — SYR 20ML LL STRL LF --

## (undated) DEVICE — HANDPIECE SET WITH COAXIAL HIGH FLOW TIP AND SUCTION TUBE: Brand: INTERPULSE

## (undated) DEVICE — INFECTION CONTROL KIT SYS

## (undated) DEVICE — SUTURE VCRL SZ 0 L27IN ABSRB UD L36MM CT-1 1/2 CIR J260H

## (undated) DEVICE — SMOKE EVACUATION PENCIL: Brand: VALLEYLAB

## (undated) DEVICE — NEEDLE HYPO 18GA L1.5IN PNK S STL HUB POLYPR SHLD REG BVL

## (undated) DEVICE — DEVICE TRNSF SPIK STL 2008S] MICROTEK MEDICAL INC]

## (undated) DEVICE — SYR 50ML LR LCK 1ML GRAD NSAF --

## (undated) DEVICE — SUTURE ABSORBABLE MONOFILAMENT 2-0 WND CLOSURE GRN V-LOC 180 VLOCL0315

## (undated) DEVICE — GARMENT,MEDLINE,DVT,INT,CALF,MED, GEN2: Brand: MEDLINE

## (undated) DEVICE — STERILE POLYISOPRENE POWDER-FREE SURGICAL GLOVES WITH EMOLLIENT COATING: Brand: PROTEXIS

## (undated) DEVICE — LABEL MED MRMC ORTH STRL

## (undated) DEVICE — Device

## (undated) DEVICE — BANDAGE COMPR M W6INXL10YD WHT BGE VELC E MTRX HK AND LOOP

## (undated) DEVICE — SOL IRR SOD CL 0.9% 3000ML --

## (undated) DEVICE — DRESSING WND W4IN L4IN FOAM N ADH POLYUR SHT OPTIFOAM

## (undated) DEVICE — REM POLYHESIVE ADULT PATIENT RETURN ELECTRODE: Brand: VALLEYLAB

## (undated) DEVICE — 4-PORT MANIFOLD: Brand: NEPTUNE 2

## (undated) DEVICE — SYSTEM SKIN CLSR 22CM DERMBND PRINEO

## (undated) DEVICE — SUTURE STRATAFIX SPRL SZ 1 L14IN ABSRB VLT L48CM CTX 1/2 SXPD2B405

## (undated) DEVICE — STERILE POLYISOPRENE POWDER-FREE SURGICAL GLOVES: Brand: PROTEXIS

## (undated) DEVICE — STRAP,POSITIONING,KNEE/BODY,FOAM,4X60": Brand: MEDLINE

## (undated) DEVICE — ZIMMER® STERILE DISPOSABLE TOURNIQUET CUFF WITH PROTECTIVE SLEEVE AND PLC, DUAL PORT, SINGLE BLADDER, 34 IN. (86 CM)

## (undated) DEVICE — 3M™ TEGADERM™ TRANSPARENT FILM DRESSING FRAME STYLE, 1627, 4 IN X 10 IN (10 CM X 25 CM), 20/CT 4CT/CASE: Brand: 3M™ TEGADERM™